# Patient Record
Sex: FEMALE | Race: WHITE | Employment: FULL TIME | URBAN - METROPOLITAN AREA
[De-identification: names, ages, dates, MRNs, and addresses within clinical notes are randomized per-mention and may not be internally consistent; named-entity substitution may affect disease eponyms.]

---

## 2016-12-16 LAB
ANTIBODY SCREEN, EXTERNAL: NORMAL
CHLAMYDIA, EXTERNAL: NORMAL
HBSAG, EXTERNAL: NORMAL
HCT, EXTERNAL: 39.9
HEPATITIS C AB,   EXT: NORMAL
HGB, EXTERNAL: 13.5
HIV, EXTERNAL: NORMAL
N. GONORRHEA, EXTERNAL: NORMAL
PAP SMEAR, EXTERNAL: NORMAL
PLATELET CNT,   EXTERNAL: 288
RPR, EXTERNAL: NORMAL
RUBELLA, EXTERNAL: NORMAL
TSH SERPL-ACNC: NORMAL M[IU]/L
TYPE, ABO & RH, EXTERNAL: NORMAL

## 2017-07-11 LAB — GRBS, EXTERNAL: NEGATIVE

## 2017-07-29 ENCOUNTER — HOSPITAL ENCOUNTER (INPATIENT)
Age: 27
LOS: 4 days | Discharge: HOME | End: 2017-08-02
Attending: OBSTETRICS & GYNECOLOGY | Admitting: OBSTETRICS & GYNECOLOGY
Payer: COMMERCIAL

## 2017-07-29 PROBLEM — O99.213 OBESITY AFFECTING PREGNANCY IN THIRD TRIMESTER: Chronic | Status: ACTIVE | Noted: 2017-07-29

## 2017-07-29 PROBLEM — O14.93 PRE-ECLAMPSIA IN THIRD TRIMESTER: Status: ACTIVE | Noted: 2017-07-29

## 2017-07-29 LAB
ABO + RH BLD: NORMAL
ALBUMIN SERPL BCP-MCNC: 2.4 G/DL (ref 3.4–5)
ALBUMIN/GLOB SERPL: 0.8 {RATIO} (ref 0.8–1.7)
ALP SERPL-CCNC: 147 U/L (ref 45–117)
ALT SERPL-CCNC: 18 U/L (ref 13–56)
ANION GAP BLD CALC-SCNC: 11 MMOL/L (ref 3–18)
AST SERPL W P-5'-P-CCNC: 16 U/L (ref 15–37)
BILIRUB SERPL-MCNC: 0.2 MG/DL (ref 0.2–1)
BLOOD GROUP ANTIBODIES SERPL: NORMAL
BUN SERPL-MCNC: 11 MG/DL (ref 7–18)
BUN/CREAT SERPL: 18 (ref 12–20)
CALCIUM SERPL-MCNC: 8.6 MG/DL (ref 8.5–10.1)
CHLORIDE SERPL-SCNC: 106 MMOL/L (ref 100–108)
CO2 SERPL-SCNC: 22 MMOL/L (ref 21–32)
CREAT SERPL-MCNC: 0.62 MG/DL (ref 0.6–1.3)
ERYTHROCYTE [DISTWIDTH] IN BLOOD BY AUTOMATED COUNT: 13.5 % (ref 11.6–14.5)
GLOBULIN SER CALC-MCNC: 3 G/DL (ref 2–4)
GLUCOSE SERPL-MCNC: 112 MG/DL (ref 74–99)
HCT VFR BLD AUTO: 35 % (ref 35–45)
HGB BLD-MCNC: 12.2 G/DL (ref 12–16)
MCH RBC QN AUTO: 30.5 PG (ref 24–34)
MCHC RBC AUTO-ENTMCNC: 34.9 G/DL (ref 31–37)
MCV RBC AUTO: 87.5 FL (ref 74–97)
PLATELET # BLD AUTO: 203 K/UL (ref 135–420)
PMV BLD AUTO: 8.9 FL (ref 9.2–11.8)
POTASSIUM SERPL-SCNC: 3.8 MMOL/L (ref 3.5–5.5)
PROT SERPL-MCNC: 5.4 G/DL (ref 6.4–8.2)
RBC # BLD AUTO: 4 M/UL (ref 4.2–5.3)
SODIUM SERPL-SCNC: 139 MMOL/L (ref 136–145)
SPECIMEN EXP DATE BLD: NORMAL
URATE SERPL-MCNC: 6 MG/DL (ref 2.6–7.2)
WBC # BLD AUTO: 8.4 K/UL (ref 4.6–13.2)

## 2017-07-29 PROCEDURE — 80053 COMPREHEN METABOLIC PANEL: CPT | Performed by: OBSTETRICS & GYNECOLOGY

## 2017-07-29 PROCEDURE — 83615 LACTATE (LD) (LDH) ENZYME: CPT | Performed by: OBSTETRICS & GYNECOLOGY

## 2017-07-29 PROCEDURE — 74011250636 HC RX REV CODE- 250/636: Performed by: OBSTETRICS & GYNECOLOGY

## 2017-07-29 PROCEDURE — 74011250637 HC RX REV CODE- 250/637: Performed by: OBSTETRICS & GYNECOLOGY

## 2017-07-29 PROCEDURE — 85027 COMPLETE CBC AUTOMATED: CPT | Performed by: OBSTETRICS & GYNECOLOGY

## 2017-07-29 PROCEDURE — 84550 ASSAY OF BLOOD/URIC ACID: CPT | Performed by: OBSTETRICS & GYNECOLOGY

## 2017-07-29 PROCEDURE — 65270000029 HC RM PRIVATE

## 2017-07-29 PROCEDURE — 77030033269 HC SLV COMPR SCD KNE2 CARD -B

## 2017-07-29 PROCEDURE — 86900 BLOOD TYPING SEROLOGIC ABO: CPT | Performed by: OBSTETRICS & GYNECOLOGY

## 2017-07-29 RX ORDER — HYDROCORTISONE 1 %
CREAM (GRAM) TOPICAL
Status: DISCONTINUED | OUTPATIENT
Start: 2017-07-29 | End: 2017-08-02 | Stop reason: HOSPADM

## 2017-07-29 RX ORDER — SODIUM CHLORIDE, SODIUM LACTATE, POTASSIUM CHLORIDE, CALCIUM CHLORIDE 600; 310; 30; 20 MG/100ML; MG/100ML; MG/100ML; MG/100ML
50 INJECTION, SOLUTION INTRAVENOUS CONTINUOUS
Status: DISCONTINUED | OUTPATIENT
Start: 2017-07-29 | End: 2017-08-02 | Stop reason: HOSPADM

## 2017-07-29 RX ORDER — SODIUM CHLORIDE 0.9 % (FLUSH) 0.9 %
5-10 SYRINGE (ML) INJECTION AS NEEDED
Status: DISCONTINUED | OUTPATIENT
Start: 2017-07-29 | End: 2017-08-02 | Stop reason: HOSPADM

## 2017-07-29 RX ORDER — SODIUM CHLORIDE 0.9 % (FLUSH) 0.9 %
5-10 SYRINGE (ML) INJECTION EVERY 8 HOURS
Status: DISCONTINUED | OUTPATIENT
Start: 2017-07-29 | End: 2017-08-01

## 2017-07-29 RX ORDER — VALACYCLOVIR HYDROCHLORIDE 500 MG/1
500 TABLET, FILM COATED ORAL DAILY
COMMUNITY
End: 2017-08-02

## 2017-07-29 RX ORDER — VALACYCLOVIR HYDROCHLORIDE 500 MG/1
500 TABLET, FILM COATED ORAL DAILY
Status: DISCONTINUED | OUTPATIENT
Start: 2017-07-30 | End: 2017-08-02 | Stop reason: HOSPADM

## 2017-07-29 RX ADMIN — DINOPROSTONE 10 MG: 10 INSERT VAGINAL at 21:00

## 2017-07-29 RX ADMIN — Medication 10 ML: at 22:00

## 2017-07-29 RX ADMIN — HYDROCORTISONE: 1 CREAM TOPICAL at 23:45

## 2017-07-29 RX ADMIN — SODIUM CHLORIDE, SODIUM LACTATE, POTASSIUM CHLORIDE, AND CALCIUM CHLORIDE 50 ML/HR: 600; 310; 30; 20 INJECTION, SOLUTION INTRAVENOUS at 20:30

## 2017-07-29 NOTE — ROUTINE PROCESS
Bedside shift change report given to Juliet Dong RN (oncoming nurse) by Jasen Christian RN (offgoing nurse). Report included the following information SBAR.

## 2017-07-29 NOTE — ROUTINE PROCESS
Pt presents to [facility] for scheduled induction for [facility], Pt oriented to room, changed into gownm EFM and toco placed and explained. Plan of care discussed, pt verbalized understanding.

## 2017-07-29 NOTE — IP AVS SNAPSHOT
Shasha Abdi 
 
 
 [address] [place] [address] Patient: Boo Ruiz MRN: [id number] :[id number] You are allergic to the following No active allergies Immunizations Administered for This Admission Name Date Tdap  Deferred () Recent Documentation Height Weight Breastfeeding? BMI OB Status Smoking Status 1.651 m 118.8 kg Unknown 43.6 kg/m2 Recent pregnancy Former Smoker Emergency Contacts Name Discharge Info Relation Home Work Mobile Ben Barkley  Spouse [3] [phone] About your hospitalization You were admitted on:  2017 You last received care in the:  [place] [address] You were discharged on:  2017 Unit phone number:  [phone] Why you were hospitalized Your primary diagnosis was:  Not on File Your diagnoses also included:  Pre-Eclampsia In Third Trimester, Obesity Affecting Pregnancy In Third Trimester Providers Seen During Your Hospitalizations Provider Role Specialty Primary office phone Edna Queen MD Attending Provider Obstetrics & Gynecology [phone] Your Primary Care Physician (PCP) Primary Care Physician Office Phone Office Fax NONE ** None ** ** None ** Follow-up Information Follow up With Details Comments Contact Info None   None ([phone]) Patient stated that they have no PCP Edna Queen MD Go to appointment has been made for Tues. 2017 at [facility] [address] [place] [address] 83 [phone] Current Discharge Medication List  
  
START taking these medications Dose & Instructions Dispensing Information Comments Morning Noon Evening Bedtime  
 ibuprofen 800 mg tablet Commonly known as:  MOTRIN Your last dose was:     
   
Your next dose is:    
   
   
 Dose:  800 mg  
 Take 1 Tab by mouth every eight (8) hours as needed. Quantity:  60 Tab Refills:  1 CONTINUE these medications which have NOT CHANGED Dose & Instructions Dispensing Information Comments Morning Noon Evening Bedtime PNV Combo No.47-Iron-FA #1-DHA 27 mg iron-1 mg -300 mg Cap Your last dose was: Your next dose is:    
   
   
 Dose:  1 Tab Take 1 Tab by mouth daily. Indications: Pregnancy Refills:  0 STOP taking these medications VALTREX 500 mg tablet Generic drug:  valACYclovir Where to Get Your Medications Information on where to get these meds will be given to you by the nurse or doctor. ! Ask your nurse or doctor about these medications  
  ibuprofen 800 mg tablet Discharge Instructions CONGRATULATIONS ON THE BIRTH OF YOUR BABY! The first six weeks after childbirth is a time of physical and emotional adjustment. This handout will help to answer questions and provide guidance during the postpartum period. Every family's adjustment is unique, so please call if you have further concerns. At anytime we can be reached at [phone]. During office hours please ask to speak to a charge nurse. After hours, the answering service will take a message and the Nurse-Midwife on-call will return your call. If your question can wait until office hours: Monday-Friday 8:30-4:00, please do so. For emergencies or urgent concerns do not hesitate to call us after hours. DIET Your body is in need of a well-balanced, high protein diet to recuperate from birth. Please continue to take your prenatal vitamins for 6 weeks or as long as you are breastfeeding. Continue to drink at least 6-8 cups of water or other liquid a day. A breastfeeding mother also needs extra protein, calories and calcium containing foods.   It is a good rule to drink fluids with every feeding in order to maintain an adequate milk supply and avoid dehydration. Your baby will probably not be bothered by things in your diet, but if the baby seems extremely fussy or develops a rash, you may want to discuss possible food intolerances with your baby's care provider. PAIN MEDICATIONS Acetaminophen (Tylenol), ibuprofen (Motrin), or other prescribed pain medication may be taken as directed to relieve discomfort. The above medications pass in very minimal amounts into the breast milk and usually will not cause problems. There are medications that may affect the baby, so please consult your baby's care provider before taking medication. If you are breastfeeding, be sure to mention this to any care provider you see so that medications that are safe may be selected. There is an excellent resource called [employer] that is a resource for medication safety in pregnancy and lactation. You can visit their website at CaseRev/ or call them toll free at [phone] if you have any questions about medication safety. UTERINE INVOLUTION / VAGINAL BLEEDING Involution is the process of the uterus returning to pre-pregnant size. It will take approximately six weeks for this process to occur. To achieve this size your uterus becomes firm to slow bleeding loss from the placental site. The first 7 days after birth, the bleeding is red and heavy. It may change with your activity and position. Some small clots are normal.   After ten days, the bleeding should be pale pink and slowed considerably. The next several weeks may progress to a pink, mucousy discharge. This may continue for 6-8 weeks, depending on your activity. During the first four weeks after delivery we recommend using sanitary pads instead of tampons. Douching should also be avoided, but it is fine to take a tub bath so long as the tub is very clean.  
 
 
ACTIVITY/EXERCISE 
 Adequate rest is essential to recovery. Try to rest or sleep when the baby sleeps. After two weeks, you may begin going for short walks, doing Kegel exercises and abdominal crunches. Avoid heavy, jarring or aerobic exercises. Remember to start out slowly and build up to your previous fitness level. Use common sense and don't overdo as rest is important and the benefits of increased rest are a quicker recovery. For the first two weeks after a  try to limit trips up or down steps. Do not lift anything heavier than the baby during this time. Lifting the baby or other objects should be done by bending at the knees rather than the waist.  Driving should be avoided during the first two to three weeks until you have the strength to push firmly on the brakes in case of an emergency. You may ride as a passenger, but DO wear a seat belt at all times. After a few weeks, you may resume normal activity at whatever pace is comfortable for you. Exercise may also be resumed gradually. Walking is a good way to start. Finally, try to be reasonable in your expectations. Caring for a new baby after major surgery can be quite trying. Arrange for assistance at home to ensure that you get enough rest.  
 
POSTPARTUM CHECK You may call the office when you return home to set up a postpartum visit. Most patients will be seen at 6 weeks after delivery, but after a  or other circumstances you may be seen in 2 weeks or less. If you are discharged from the hospital with staples that must be removed, you will be asked to come in sooner. At your postpartum visit, a pelvic exam may be performed. If you are having any problems or concerns, please do not hesitate to call. Once again our number is [phone]. MOOD CHANGES Significant hormonal changes occur in the days following delivery, and as a result, many women experience brief episodes of tearfulness or feeling \"blue. \"  These emotional swings may be made worse by lack of sleep and by the adjustments inherent in becoming a mother. For some women, these fluctuations are minor. For others, they are overwhelming; creating feelings of anxiety, depression, or the inability to cope. If you have difficulty functioning as a result of feeling down, or if the mood changes seem severe, do not improve, or result is thoughts of harming yourself or others CALL RIGHT AWAY. PERINEAL CARE The basic goals of perineal care are to prevent infection, to relieve pain and promote healing. Your stitches will dissolve in four to six weeks, and do not need to be removed. After urinating, please continue to clean with warm water from front to back. Please continue sitz baths as instructed twice a day for a week or as needed. Call the office if you see pus in the suture site, or have unusual or severe swelling or pain that seems to be getting worse. INCISION CARE If you had a , clean and dry the incision gently as you would the rest of your body. Washing over the area with soap and water, and showering are fine. If steri-strips are present they will gradually come off with time. Tub baths are permitted. You may experience numbness and burning in the area surrounding the incision which usually resolves gradually over the next several weeks or months. RETURN OF MENSTRUATION Your first menstrual period may occur as soon as four to six weeks after your delivery if you are not breast-feeding. If breast-feeding it is more difficult to predict when your first period will occur. Even if you are not yet menstruating, you may be ovulating and it may be possible to conceive again. It is common for your first period after childbirth to be very heavy with an increased amount of cramping. BREASTS Breast-feeding Mothers: Colostrum is excreted in the first 24-72 hours. Mature breast milk will appear on the 2nd to 5th day. Engorgement may occur with the mature milk making your breasts feel warm and very full. Frequent feedings will make you more comfortable. Babies do not nurse on regular schedules. Nursing every 1 1/2 to 2 hours is normal and frequent feeding DOES NOT mean you are not making enough milk. To avoid nipple confusion, do not give bottles for the first 4 weeks. Growth spurts are common and may require more frequent feedings. This is the way baby increases your milk supply. During a growth spurt, you may feel you are feeding very frequently and that your breasts are \"empty. \"  Don't worry, your milk is produced by supply and demand so this increased frequency of feeding will increase your milk supply within 48 hours. Sore nipples may occur with frequent feedings and are sometimes also caused by improper latch. Check for a proper latch. Baby should have a wide open mouth. Use different positions at each feeding if possible. Express a small amount of colostrum or breast milk onto the sore area and leave bra flaps unlatched until dry. The lactation consultant at [facility] is available for outpatient consultation without charge. Call [phone] from Monday-Friday 9:00am- 3:00pm to arrange an outpatient appointment with her. Local [facility] and consultants may also be very helpful. If You Are Not Breast-feeding: You will experience swelling, engorgement and some milk production. There are no safe medications available to stop lactation. Some remedies for engorgement include: wearing a tight bra, ice packs and cold green cabbage leaves placed between the breast and your bra. Change these frequently. Tylenol or Motrin should help with the discomfort. SEXUAL ADJUSTMENTS We recommend that you wait at least four weeks before resuming sexual intercourse.   A sore perineum, a demanding baby and fatigue will certainly affect your ability to enjoy lovemaking! A vaginal lubricant is recommended to help with any dryness. It is very important to remember that you will ovulate BEFORE your first period and can conceive. If you do not wish another pregnancy right away, please take precautions to avoid pregnancy. If you would like a prescription method of birth control, please discuss this with us at your 6 week visit. ELIMINATION We remind all postpartum patients that it may take a few days for your bowels to return to normal, especially if you had a long labor. For those who had C-sections or severe lacerations, we recommend that you use a stool softener twice daily for at least two weeks. Many stool softeners are over-the-counter. Colace (Docusate Sodium) is recommended. Bulk forming agents such as Metamucil or Fibercon may be used daily in addition to a stool softener to promote regular bowel movements. Eating fresh fruits and vegetables along with whole grains is helpful as well. Do not be afraid to have a bowel movement as your stitches will not \"come out\" in the course of having a bowel movement. Urination may be difficult due to soreness around the urethra, or as an after effect of epidural.  This is temporary and can be helped  by squirting water over the perineum or try going in the shower. Hemorrhoids are common after birth. Tucks pads, Anusol cream and avoiding constipation are helpful. If constipation does occur, you may take Milk of Magnesia or Senekot according to the package instructions. DANGER SIGNS! CALL WITHOUT DELAY IF YOU ARE EXPERIENCING ANY OF THE FOLLOWING: 
* Unusually heavy bleeding, soaking more than 1 or more pads in an hour. * Vaginal discharge with strong foul odor. * Fever of 101 or higher * Unusual pain or tenderness in the abdominal area. * If breasts are red, hot or have a painful lump. * Depression that persists longer than 1-2 weeks or is severe. * Any urinary frequency accompanied by urgency or pain. * A lump in leg or calf especially if painful, warm or red. We thank you for choosing us for your prenatal care and/or delivery. We wish you all happiness and health with your baby for his or her lifetime! Karen Metz CNM Patient armband removed and shredded Discharge Instructions Attachments/References POSTPARTUM (ENGLISH) BREASTFEEDING (ENGLISH) BREASTFEEDING MOTHERS: NUTRITION (ENGLISH) DEPRESSION: POSTPARTUM (ENGLISH) Discharge Orders None Introducing [facility]! Willadean Saint introduces [facility] patient portal. Now you can access parts of your medical record, email your doctor's office, and request medication refills online. 1. In your internet browser, go to [email or web address] 2. Click on the First Time User? Click Here link in the Sign In box. You will see the New Member Sign Up page. 3. Enter your [facility] Access Code exactly as it appears below. You will not need to use this code after youve completed the sign-up process. If you do not sign up before the expiration date, you must request a new code. · [facility] Access Code: [email or web address] Expires: 10/31/2017 11:13 AM 
 
4. Enter the last four digits of your Social Security Number (xxxx) and Date of Birth (mm/dd/yyyy) as indicated and click Submit. You will be taken to the next sign-up page. 5. Create a [facility] ID. This will be your [facility] login ID and cannot be changed, so think of one that is secure and easy to remember. 6. Create a [facility] password. You can change your password at any time. 7. Enter your Password Reset Question and Answer. This can be used at a later time if you forget your password. 8. Enter your e-mail address. You will receive e-mail notification when new information is available in [address] 9. Click Sign Up. You can now view and download portions of your medical record. 10. Click the Download Summary menu link to download a portable copy of your medical information. If you have questions, please visit the Frequently Asked Questions section of the [employer] website. Remember, [employer] is NOT to be used for urgent needs. For medical emergencies, dial 911. Now available from your iPhone and Android! General Information Please provide this summary of care documentation to your next provider. Patient Signature:  ____________________________________________________________ Date:  ____________________________________________________________  
  
[facility] Provider Signature:  ____________________________________________________________ Date:  ____________________________________________________________ More Information After Your Delivery (the Postpartum Period): Care Instructions Your Care Instructions Congratulations on the birth of your baby. Like pregnancy, the  period can be a time of excitement, nghia, and exhaustion. You may look at your wondrous little baby and feel happy. You may also be overwhelmed by your new sleep hours and new responsibilities. At first, babies often sleep during the days and are awake at night. They do not have a pattern or routine. They may make sudden gasps, jerk themselves awake, or look like they have crossed eyes. These are all normal, and they may even make you smile. In these first weeks after delivery, try to take good care of yourself. It may take 4 to 6 weeks to feel like yourself again, and possibly longer if you had a  birth. You will likely feel very tired for several weeks. Your days will be full of ups and downs, but lots of nghia as well. Follow-up care is a key part of your treatment and safety. Be sure to make and go to all appointments, and call your doctor if you are having problems.  It's also a good idea to know your test results and keep a list of the medicines you take. How can you care for yourself at home? Take care of your body after delivery · Use pads instead of tampons for the bloody flow that may last as long as 2 weeks. · Ease cramps with ibuprofen (Advil, Motrin). · Ease soreness of hemorrhoids and the area between your vagina and rectum with ice compresses or witch hazel pads. · Ease constipation by drinking lots of fluid and eating high-fiber foods. Ask your doctor about over-the-counter stool softeners. · Cleanse yourself with a gentle squeeze of warm water from a bottle instead of wiping with toilet paper. · Take a sitz bath in warm water several times a day. · Wear a good nursing bra. Ease sore and swollen breasts with warm, wet washcloths. · If you are not breastfeeding, use ice rather than heat for breast soreness. · Your period may not start for several months if you are breastfeeding. You may bleed more, and longer at first, than you did before you got pregnant. · Wait until you are healed (about 4 to 6 weeks) before you have sexual intercourse. Your doctor will tell you when it is okay to have sex. · Try not to travel with your baby for 5 or 6 weeks. If you take a long car trip, make frequent stops to walk around and stretch. Avoid exhaustion · Rest every day. Try to nap when your baby naps. · Ask another adult to be with you for a few days after delivery. · Plan for  if you have other children. · Stay flexible so you can eat at odd hours and sleep when you need to. Both you and your baby are making new schedules. · Plan small trips to get out of the house. Change can make you feel less tired. · Ask for help with housework, cooking, and shopping. Remind yourself that your job is to care for your baby. Know about help for postpartum depression · \"Baby blues\" are common for the first 1 to 2 weeks after birth. You may cry or feel sad or irritable for no reason. · Rest whenever you can. Being tired makes it harder to handle your emotions. · Go for walks with your baby. · Talk to your partner, friends, and family about your feelings. · If your symptoms last for more than a few weeks, or if you feel very depressed, ask your doctor for help. · Postpartum depression can be treated. Support groups and counseling can help. Sometimes medicine can also help. Stay healthy · Eat healthy foods so you have more energy, make good breast milk, and lose extra baby pounds. · If you breastfeed, avoid alcohol and drugs. Stay smoke-free. If you quit during pregnancy, congratulations. · Start daily exercise after 4 to 6 weeks, but rest when you feel tired. · Learn exercises to tone your belly. Do Kegel exercises to regain strength in your pelvic muscles. You can do these exercises while you stand or sit. ¨ Squeeze the same muscles you would use to stop your urine. Your belly and thighs should not move. ¨ Hold the squeeze for 3 seconds, and then relax for 3 seconds. ¨ Start with 3 seconds. Then add 1 second each week until you are able to squeeze for 10 seconds. ¨ Repeat the exercise 10 to 15 times for each session. Do three or more sessions each day. · Find a class for new mothers and new babies that has an exercise time. · If you had a  birth, give yourself a bit more time before you exercise, and be careful. When should you call for help? Call 911 anytime you think you may need emergency care. For example, call if: 
· You passed out (lost consciousness). Call your doctor now or seek immediate medical care if: 
· You have severe vaginal bleeding. This means you are passing blood clots and soaking through a pad each hour for 2 or more hours. · You are dizzy or lightheaded, or you feel like you may faint. · You have a fever. · You have new belly pain, or your pain gets worse. Watch closely for changes in your health, and be sure to contact your doctor if: · Your vaginal bleeding seems to be getting heavier. · You have new or worse vaginal discharge. · You feel sad, anxious, or hopeless for more than a few days. · You do not get better as expected. Where can you learn more? Go to http://katia-zia.info/. Enter A461 in the search box to learn more about \"After Your Delivery (the Postpartum Period): Care Instructions. \" Current as of: March 16, 2017 Content Version: 11.3 © 5727-1271 [email or web address]. Care instructions adapted under license by [employer] (which disclaims liability or warranty for this information). If you have questions about a medical condition or this instruction, always ask your healthcare professional. [address] any warranty or liability for your use of this information. Breastfeeding: Care Instructions Your Care Instructions Breastfeeding has many benefits. It may lower your baby's chances of getting an infection. It also may prevent your baby from having problems such as diabetes and high cholesterol later in life. Breastfeeding also helps you bond with your baby. The American Academy of Pediatrics recommends breastfeeding for at least a year. That may be very hard for many women to do, but breastfeeding even for a shorter period of time is a health benefit to you and your baby. In the first days after birth, your breasts make a thick, yellow liquid called colostrum. This liquid gives your baby nutrients and antibodies against infection. It is all that babies need in the first days after birth. Your breasts will fill with milk a few days after the birth. Breastfeeding is a skill that gets better with practice. It is common to have some problems. Some women have sore or cracked nipples, blocked milk ducts, or a breast infection (mastitis).  But if you feed your baby every 1 to 2 hours during the day and follow the tips on this sheet, you may not have these problems. You can treat these problems if they happen and continue breastfeeding. Follow-up care is a key part of your treatment and safety. Be sure to make and go to all appointments, and call your doctor if you are having problems. It's also a good idea to know your test results and keep a list of the medicines you take. How can you care for yourself at home? · Breastfeed your baby whenever he or she is hungry. In the first 2 weeks, your baby will feed about every 1 to 3 hours. This will help you keep up your supply of milk. · Put a bed pillow or a nursing pillow on your lap to support your arms and your baby. · Hold your baby in a comfortable position. ¨ You can hold your baby in several ways. One of the most common positions is the cradle hold. One arm supports your baby, with his or her head in the bend of your elbow. Your open hand supports your baby's bottom or back. Your baby's belly lies against yours. ¨ If you had your baby by , or , try the football hold. This position keeps your baby off your belly. Tuck your baby under your arm, with his or her body along the side you will be feeding on. Support your baby's upper body with your arm. With that hand you can control your baby's head to bring his or her mouth to your breast. 
¨ Try different positions with each feeding. If you are having problems, ask for help from your doctor or a lactation consultant. · To get your baby to latch on: 
¨ Support and narrow your breast with one hand using a \"U hold,\" with your thumb on the outer side of your breast and your fingers on the inner side. You can also use a \"C hold,\" with all your fingers below the nipple and your thumb above it. Try the different holds to get the deepest latch for whichever breastfeeding position you use. Your other arm is behind your baby's back, with your hand supporting the base of the baby's head. Position your fingers and thumb to point toward your baby's ears. ¨ You can touch your baby's lower lip with your nipple to get your baby to open his or her mouth. Wait until your baby opens up really wide, like a big yawn. Then be sure to bring the baby quickly to your breastnot your breast to the baby. As you bring your baby toward your breast, use your other hand to support the breast and guide it into his or her mouth. ¨ Both the nipple and a large portion of the darker area around the nipple (areola) should be in the baby's mouth. The baby's lips should be flared outward, not folded in (inverted). ¨ Listen for a regular sucking and swallowing pattern while the baby is feeding. If you cannot see or hear a swallowing pattern, watch the baby's ears, which will wiggle slightly when the baby swallows. If the baby's nose appears to be blocked by your breast, tilt the baby's head back slightly, so just the edge of one nostril is clear for breathing. ¨ When your baby is latched, you can usually remove your hand from supporting your breast and bring it under your baby to cradle him or her. Now just relax and breastfeed your baby. · You will know that your baby is feeding well when: 
¨ His or her mouth covers a lot of the areola, and the lips are flared out. ¨ His or her chin and nose rest against your breast. 
¨ Sucking is deep and rhythmic, with short pauses. ¨ You are able to see and hear your baby swallowing. ¨ You do not feel pain in your nipple. · If your baby takes only one breast at a feeding, start the next feeding on the other breast. 
· Anytime you need to remove your baby from the breast, put one finger in the corner of his or her mouth. Push your finger between your baby's gums to gently break the seal. If you do not break the tight seal before you remove your baby, your nipples can become sore, cracked, or bruised.  
· After feeding your baby, gently pat his or her back to let out any swallowed air. After your baby burps, offer the breast again, or offer the other breast. Sometimes a baby will want to keep feeding after being burped. When should you call for help? Call your doctor now or seek immediate medical care if: 
· You have symptoms of a breast infection, such as: 
¨ Increased pain, swelling, redness, or warmth around a breast. 
¨ Red streaks extending from the breast. 
¨ Pus draining from a breast. 
¨ A fever. · Your baby has no wet diapers for 6 hours. Watch closely for changes in your health, and be sure to contact your doctor if: 
· Your baby has trouble latching on to your breast. 
· You continue to have pain or discomfort when breastfeeding. · You have other questions or concerns. Where can you learn more? Go to http://katia-zia.info/. Enter P492 in the search box to learn more about \"Breastfeeding: Care Instructions. \" Current as of: March 16, 2017 Content Version: 11.3 © 7528-8787 [employer]. Care instructions adapted under license by [employer] (which disclaims liability or warranty for this information). If you have questions about a medical condition or this instruction, always ask your healthcare professional. [place] [address] any warranty or liability for your use of this information. Nutrition for Breastfeeding Mothers: Care Instructions Your Care Instructions When a woman breastfeeds her baby, she needs more nutrients to keep herself healthy and to make the baby's milk. Breastfeeding helps build the bond between you and your baby. It gives your baby excellent health benefits. A healthy diet includes eating a variety of foods from the basic food groups: grains, vegetables, fruits, milk and milk products (such as cheese and yogurt), and meat and dried beans. Eating well during breastfeeding will ensure that you stay healthy and your baby grows and develops normally. Follow-up care is a key part of your treatment and safety. Be sure to make and go to all appointments, and call your doctor if you are having problems. It's also a good idea to know your test results and keep a list of the medicines you take. How can you care for yourself at home? · Include 3 to 4 cups of nonfat or low-fat milk or milk products in your diet every day. These include: ¨ Milk (8 ounces equals 1 cup). ¨ Ice cream (1½ cups equals 1 cup of milk). ¨ Cheese (1½ ounces of cheese equals 1 cup). ¨ Yogurt (8 ounces equals 1 cup). · Eat at least 7 ounces of grains, such as cereals, breads, crackers, rice, or pasta, every day. One ounce is about 1 slice of bread, 1 cup of breakfast cereal, or ½ cup of cooked rice, cereal, or pasta. · Eat 3 cups of vegetables each day. Choices include: ¨ Dark-green vegetables such as broccoli and spinach. ¨ Orange vegetables such as carrots and sweet potatoes. ¨ Dried beans (such as musa and kidney beans) and peas (such as lentils). · Every day, eat 2 cups of fresh, frozen, or canned fruit. · Eat 6½ ounces each day of protein, such as chicken, fish, lean meat, eggs, peanut butter, dried beans and peas, nuts, and seeds. One egg, 1 tablespoon of peanut butter, or ½ ounce of nuts or seeds equals 1 ounce of protein. A ½ cup of cooked beans equals 2 ounces of protein. · Drink plenty of fluids, enough so that your urine is light yellow or clear like water. If you have kidney, heart, or liver disease and have to limit fluids, talk with your doctor before you increase the amount of fluids you drink. · Limit caffeine products, such as coffee, tea, chocolate, and some sodas. Caffeine can pass to your baby through breast milk. It may cause fussiness and sleep problems in babies. · Your doctor may recommend a vitamin supplement. Take it as recommended. · Consider joining a breastfeeding support group.  These are offered at many hospitals and birthing centers by nurses, nurse-midwives, or lactation consultants. When should you call for help? Watch closely for changes in your health, and be sure to contact your doctor if: 
· You feel that you are not making enough milk for your baby. · You are losing a lot of weight. · You do not think your baby is gaining enough weight. · You would like help to plan a healthy diet. Where can you learn more? Go to http://katia-zia.info/. Enter P234 in the search box to learn more about \"Nutrition for Breastfeeding Mothers: Care Instructions. \" Current as of: May 30, 2016 Content Version: 11.3 © 9128-8592 [employer]. Care instructions adapted under license by [employer] (which disclaims liability or warranty for this information). If you have questions about a medical condition or this instruction, always ask your healthcare professional. [place] [address] any warranty or liability for your use of this information. Depression After Childbirth: Care Instructions Your Care Instructions Many women get the \"baby blues\" during the first few days after childbirth. You may lose sleep, feel irritable, and cry easily. You may feel happy one minute and sad the next. Hormone changes are one cause of these emotional changes. Also, the demands of a new baby, along with visits from relatives or other family needs, add to a mother's stress. The \"baby blues\" often peak around the fourth day. Then they ease up in less than 2 weeks. If your moodiness or anxiety lasts for more than 2 weeks, or if you feel like life is not worth living, you may have postpartum depression. This is different for each mother. Some mothers with serious depression may worry intensely about their infant's well-being. Others may feel distant from their child. Some mothers might even feel that they might harm their baby. A mother may have signs of paranoia, wondering if someone is watching her. Depression is not a sign of weakness. It is a medical condition that requires treatment. Medicine and counseling often work well to reduce depression. Talk to your doctor about taking antidepressant medicine while breastfeeding. Follow-up care is a key part of your treatment and safety. Be sure to make and go to all appointments, and call your doctor if you are having problems. It's also a good idea to know your test results and keep a list of the medicines you take. How do you know if you are depressed? With all the changes in your life, you may not know if you are depressed. Pregnancy sometimes causes changes in how you feel that are similar to the symptoms of depression. Symptoms of depression include: · Feeling sad or hopeless and losing interest in daily activities. These are the most common symptoms of depression. · Sleeping too much or not enough. · Feeling tired. You may feel as if you have no energy. · Eating too much or too little. · Writing or talking about death, such as writing suicide notes or talking about guns, knives, or pills. Keep the numbers for these national suicide hotlines: 5-303-180-TALK (6-322-100-426-324-6956) and 1-287-VLGFCQT (6-345.950.1929). If you or someone you know talks about suicide or feeling hopeless, get help right away. How can you care for yourself at home? · Be safe with medicines. Take your medicines exactly as prescribed. Call your doctor if you think you are having a problem with your medicine. · Eat a healthy diet so that you can keep up your energy. · Get regular daily exercise, such as walks, to help improve your mood. · Get as much sunlight as possible. Keep your shades and curtains open. Get outside as much as you can. · Avoid using alcohol or other substances to feel better. · Get as much rest and sleep as possible. Avoid doing too much. Being too tired can increase depression. · Play stimulating music throughout your day and soothing music at night. · Schedule outings and visits with friends and family. Ask them to call you regularly, so that you do not feel alone. · Ask for help with preparing food and other daily tasks. Family and friends are often happy to help a mother with a . · Be honest with yourself and those who care about you. Tell them about your struggle. · Join a support group of new mothers. No one can better understand the challenges of caring for a  than other new mothers. · If you feel like life is not worth living or are feeling hopeless, get help right away. Keep the numbers for these national suicide hotlines: 2-749-361-TALK (4-643.129.9648) and 6-182-SBFFOQS (2-253.820.5178). When should you call for help? Call 911 anytime you think you may need emergency care. For example, call if: 
· You feel you cannot stop from hurting yourself, your baby, or someone else. Call your doctor now or seek immediate medical care if: 
· You are having trouble caring for yourself or your baby. · You hear voices. Watch closely for changes in your health, and be sure to contact your doctor if: 
· You have problems with your depression medicine. · You do not get better as expected. Where can you learn more? Go to http://katia-zia.info/. Enter Z822 in the search box to learn more about \"Depression After Childbirth: Care Instructions. \" Current as of: 2016 Content Version: 11.3 © 6294-2115 [employer]. Care instructions adapted under license by [employer] (which disclaims liability or warranty for this information). If you have questions about a medical condition or this instruction, always ask your healthcare professional. [address] any warranty or liability for your use of this information.

## 2017-07-30 ENCOUNTER — ANESTHESIA (OUTPATIENT)
Dept: LABOR AND DELIVERY | Age: 27
End: 2017-07-30
Payer: COMMERCIAL

## 2017-07-30 ENCOUNTER — ANESTHESIA EVENT (OUTPATIENT)
Dept: LABOR AND DELIVERY | Age: 27
End: 2017-07-30
Payer: COMMERCIAL

## 2017-07-30 PROCEDURE — 74011250637 HC RX REV CODE- 250/637: Performed by: OBSTETRICS & GYNECOLOGY

## 2017-07-30 PROCEDURE — 65270000029 HC RM PRIVATE

## 2017-07-30 PROCEDURE — 74011250636 HC RX REV CODE- 250/636: Performed by: OBSTETRICS & GYNECOLOGY

## 2017-07-30 PROCEDURE — 4A1H7CZ MONITORING OF PRODUCTS OF CONCEPTION, CARDIAC RATE, VIA NATURAL OR ARTIFICIAL OPENING: ICD-10-PCS | Performed by: OBSTETRICS & GYNECOLOGY

## 2017-07-30 PROCEDURE — 74011000250 HC RX REV CODE- 250

## 2017-07-30 PROCEDURE — 77030020255 HC SOL INJ LR 1000ML BG

## 2017-07-30 PROCEDURE — 3E0P7GC INTRODUCTION OF OTHER THERAPEUTIC SUBSTANCE INTO FEMALE REPRODUCTIVE, VIA NATURAL OR ARTIFICIAL OPENING: ICD-10-PCS | Performed by: OBSTETRICS & GYNECOLOGY

## 2017-07-30 PROCEDURE — 3E0R3CZ INTRODUCE REGIONAL ANESTH IN SPINAL CANAL, PERC: ICD-10-PCS | Performed by: OBSTETRICS & GYNECOLOGY

## 2017-07-30 PROCEDURE — 74011250636 HC RX REV CODE- 250/636

## 2017-07-30 PROCEDURE — 77030007879 HC KT SPN EPDRL TELE -B: Performed by: NURSE ANESTHETIST, CERTIFIED REGISTERED

## 2017-07-30 PROCEDURE — 10H07YZ INSERTION OF OTHER DEVICE INTO PRODUCTS OF CONCEPTION, VIA NATURAL OR ARTIFICIAL OPENING: ICD-10-PCS | Performed by: OBSTETRICS & GYNECOLOGY

## 2017-07-30 PROCEDURE — 10H073Z INSERTION OF MONITORING ELECTRODE INTO PRODUCTS OF CONCEPTION, VIA NATURAL OR ARTIFICIAL OPENING: ICD-10-PCS | Performed by: OBSTETRICS & GYNECOLOGY

## 2017-07-30 PROCEDURE — 3E033VJ INTRODUCTION OF OTHER HORMONE INTO PERIPHERAL VEIN, PERCUTANEOUS APPROACH: ICD-10-PCS | Performed by: OBSTETRICS & GYNECOLOGY

## 2017-07-30 RX ORDER — FENTANYL CITRATE 50 UG/ML
INJECTION, SOLUTION INTRAMUSCULAR; INTRAVENOUS
Status: COMPLETED
Start: 2017-07-30 | End: 2017-07-30

## 2017-07-30 RX ORDER — LIDOCAINE HYDROCHLORIDE 10 MG/ML
INJECTION INFILTRATION; PERINEURAL
Status: DISPENSED
Start: 2017-07-30 | End: 2017-07-31

## 2017-07-30 RX ORDER — OXYTOCIN/RINGER'S LACTATE 20/1000 ML
PLASTIC BAG, INJECTION (ML) INTRAVENOUS
Status: COMPLETED
Start: 2017-07-30 | End: 2017-07-31

## 2017-07-30 RX ORDER — PHENYLEPHRINE HCL IN 0.9% NACL 0.4MG/10ML
80 SYRINGE (ML) INTRAVENOUS AS NEEDED
Status: DISCONTINUED | OUTPATIENT
Start: 2017-07-30 | End: 2017-07-31 | Stop reason: HOSPADM

## 2017-07-30 RX ORDER — LIDOCAINE HYDROCHLORIDE 20 MG/ML
INJECTION, SOLUTION EPIDURAL; INFILTRATION; INTRACAUDAL; PERINEURAL AS NEEDED
Status: DISCONTINUED | OUTPATIENT
Start: 2017-07-30 | End: 2017-07-31 | Stop reason: HOSPADM

## 2017-07-30 RX ORDER — OXYTOCIN IN 5 % DEXTROSE 30/500 ML
.5-2 PLASTIC BAG, INJECTION (ML) INTRAVENOUS
Status: DISCONTINUED | OUTPATIENT
Start: 2017-07-30 | End: 2017-08-02 | Stop reason: HOSPADM

## 2017-07-30 RX ORDER — CASTOR OIL 100 %
OIL (ML) ORAL
Status: DISCONTINUED
Start: 2017-07-30 | End: 2017-07-31

## 2017-07-30 RX ORDER — LIDOCAINE HYDROCHLORIDE AND EPINEPHRINE 15; 5 MG/ML; UG/ML
INJECTION, SOLUTION EPIDURAL AS NEEDED
Status: DISCONTINUED | OUTPATIENT
Start: 2017-07-30 | End: 2017-07-31 | Stop reason: HOSPADM

## 2017-07-30 RX ORDER — FENTANYL CITRATE 50 UG/ML
100 INJECTION, SOLUTION INTRAMUSCULAR; INTRAVENOUS ONCE
Status: COMPLETED | OUTPATIENT
Start: 2017-07-30 | End: 2017-07-30

## 2017-07-30 RX ORDER — BUPIVACAINE HYDROCHLORIDE 5 MG/ML
INJECTION, SOLUTION EPIDURAL; INTRACAUDAL AS NEEDED
Status: DISCONTINUED | OUTPATIENT
Start: 2017-07-30 | End: 2017-07-31 | Stop reason: HOSPADM

## 2017-07-30 RX ORDER — ROPIVACAINE HYDROCHLORIDE 2 MG/ML
INJECTION, SOLUTION EPIDURAL; INFILTRATION; PERINEURAL AS NEEDED
Status: DISCONTINUED | OUTPATIENT
Start: 2017-07-30 | End: 2017-07-31 | Stop reason: HOSPADM

## 2017-07-30 RX ADMIN — LIDOCAINE HYDROCHLORIDE 10 ML: 20 INJECTION, SOLUTION EPIDURAL; INFILTRATION; INTRACAUDAL; PERINEURAL at 20:30

## 2017-07-30 RX ADMIN — FENTANYL CITRATE 100 MCG: 50 INJECTION, SOLUTION INTRAMUSCULAR; INTRAVENOUS at 18:57

## 2017-07-30 RX ADMIN — LIDOCAINE HYDROCHLORIDE 10 ML: 20 INJECTION, SOLUTION EPIDURAL; INFILTRATION; INTRACAUDAL; PERINEURAL at 20:25

## 2017-07-30 RX ADMIN — LIDOCAINE HYDROCHLORIDE AND EPINEPHRINE 3 ML: 15; 5 INJECTION, SOLUTION EPIDURAL at 18:49

## 2017-07-30 RX ADMIN — LIDOCAINE HYDROCHLORIDE 10 ML: 20 INJECTION, SOLUTION EPIDURAL; INFILTRATION; INTRACAUDAL; PERINEURAL at 22:30

## 2017-07-30 RX ADMIN — BUPIVACAINE HYDROCHLORIDE 10 ML: 5 INJECTION, SOLUTION EPIDURAL; INTRACAUDAL at 22:38

## 2017-07-30 RX ADMIN — SODIUM CHLORIDE, SODIUM LACTATE, POTASSIUM CHLORIDE, AND CALCIUM CHLORIDE 50 ML/HR: 600; 310; 30; 20 INJECTION, SOLUTION INTRAVENOUS at 22:35

## 2017-07-30 RX ADMIN — ROPIVACAINE HYDROCHLORIDE 5 ML: 2 INJECTION, SOLUTION EPIDURAL; INFILTRATION; PERINEURAL at 18:55

## 2017-07-30 RX ADMIN — VALACYCLOVIR HYDROCHLORIDE 500 MG: 500 TABLET, FILM COATED ORAL at 10:21

## 2017-07-30 RX ADMIN — ROPIVACAINE HYDROCHLORIDE 5 ML: 2 INJECTION, SOLUTION EPIDURAL; INFILTRATION; PERINEURAL at 18:54

## 2017-07-30 RX ADMIN — Medication 10 ML/HR: at 18:57

## 2017-07-30 RX ADMIN — Medication 2 MILLI-UNITS/MIN: at 10:43

## 2017-07-30 NOTE — PROGRESS NOTES
Called L&D, they are having problems getting IV access , may need anesthesia consult.  Will have bedside US ready

## 2017-07-30 NOTE — ANESTHESIA PROCEDURE NOTES
Epidural Block    Start time: 7/30/2017 6:20 PM  End time: 7/30/2017 6:56 PM  Performed by: Abhishek Garcia by: Foster Jackson     Pre-Procedure  Indication: labor epidural    Preanesthetic Checklist: patient identified, risks and benefits discussed, anesthesia consent, patient being monitored, timeout performed and anesthesia consent    Timeout Time: 18:26        Epidural:   Patient position:  Seated  Prep region:  Lumbar  Prep: Betadine and Patient draped    Prep comment:  X3  Location:  L3-4    Needle and Epidural Catheter:   Needle Type:  Tuohy  Needle Gauge:  18 G  Injection Technique:  Loss of resistance using saline  Attempts:  2  Catheter Size:  20 G  Catheter at Skin Depth (cm):  15  Depth in Epidural Space (cm):  7  Events: no blood with aspiration, no cerebrospinal fluid with aspiration, no paresthesia and negative aspiration test    Test Dose:  Lidocaine 1.5% w/ epi and negative (3 cc's @ 1836 and 1849)    Assessment:   Catheter Secured:  Tegaderm and tape  Insertion:  Uncomplicated  Patient tolerance:  Patient tolerated the procedure well with no immediate complications  First attempt - no difficulties with insertion. Aspirated blood before test dose. Catheter pulled back but continued to aspirate blood.  Placed second catheter without difficulty, test dose negative, Fentanyl 50 + 50 mcg 1853 via KAI

## 2017-07-30 NOTE — PROGRESS NOTES
OB Progress Note    S: Pt reports pain is mild    O: Patient Vitals for the past 4 hrs:   Temp Pulse BP   07/30/17 1416 98.5 °F (36.9 °C) 74 (!) 157/97   07/30/17 1401 - 70 (!) 157/97   07/30/17 1346 - 69 158/87   07/30/17 1331 - 75 148/89   07/30/17 1329 - 78 145/90   07/30/17 1201 - 76 122/75   07/30/17 1146 - 83 125/86   07/30/17 1131 - 78 128/84   07/30/17 1116 - 81 135/73   07/30/17 1101 - 79 129/80   07/30/17 1055 97.9 °F (36.6 °C) - -   07/30/17 1047 - 89 133/84   07/30/17 1046 - 88 142/83            VE: unchanged     TOCO: irregular     FHT: category1      Presentation:V  A/P: IUP at  [id number], Pre eclampsia, IOL with Pit drip,reassuring fetal status          GBS negative  Jarred Jarvis MD  July 30, 2017

## 2017-07-30 NOTE — PROGRESS NOTES
Labor Progress Note  Michelle Gaona reports SROM. Clear fluid noted. Agrees to SVE. Asking about epidural.  Discuss may be optimal to wait a bit but we can make a different plan if she is too uncomfortable. She elects to wait a bit. Pelvic exam:       Cervical Exam  Dilation (cm): 3 cms in a very snug bony pelvis  Eff: 70 %  Station: -2  Membrane Status: SROM  FHTs Reactive from 140  UCs q 2-4 with pitocin at 10 mu    Visit Vitals    /86    Pulse 84    Temp 98.3 °F (36.8 °C)    Resp 18    Ht 5' 5\" (1.651 m)    Wt 118.8 kg (262 lb)    Breastfeeding No    BMI 43.6 kg/m2       Temp (24hrs), Av.1 °F (36.7 °C), Min:97.8 °F (36.6 °C), Max:98.5 °F (36.9 °C)      Recent Labs      17   2040   WBC  8.4   HGB  12.2           Assessment: SROM. Irregular contraction pattern. Reassuring FHTs. Plan: Continue pitocin and monitor for progress. Epidural soon. Dr Natividad Murguia updated.    Toshia Lopez CNM  2017  6:09 PM

## 2017-07-30 NOTE — ROUTINE PROCESS
[facility] care of patient, received report from [address], [address], DEYANIRA at bedside, IUPC placed, SVE 2-3/70/-2.  Spoke with Debbie Figueroa CNM, DEYANIRA notified as follows: SVE 90/-2, pain level has been increased to 10/10, anesthesia paged to give bolus through epidural catheter. Pitocin has been decreased to 6mu. Order given to decrease Pitocin to 3mu.  SVE 10/100/0, CNM notified. CNM is in with another patient, will stop Pitocin.  Dr. Nessa Barraza is on her way to unit, gave order to restart Pitocin.  Spoke with SOILA Jason CNM, report given as follows: recheck of cervix SVE -/0, Pitocin has been restarted, patient cont. is complaining of pain with contractions will page anesthesia to bolus through epidural catheter. 2801 Viviane Fiore CRNA at bedside to bolus through epidural catheter. Alex Geller CNM at bedside, SVE 10, patient will labor down. 2310 Patient placed in left side lying with right leg up in stirupps. 2350 Patient placed in right side lying position with left leg in stirrups. 0020 Patient placed in left side lying position with right leg up in stirrups. 0105  Patient placed in position and starting to push.  0225 SOILA Jason CNM called to bedside to assess large amount of bleeding noted during pushing. 0230 Patient placed in stirrups and continues to push. 0247  of viable baby girl over 2 nd degree tear repaired by DEYANIRA, EBL 350ml, cord clamped and cut, infant taken to warmer immediately for evaluation by nursery nurse and Dr. Yogi Oshea. 0510 Patient ambulated to restroom with assistance and was able to void, isaias-care given, pads and gown chnged. 2036 TRANSFER - OUT REPORT:    Verbal report given to Nettie Moran RN (name) on Ivett Layne  being transferred to [address] [place] [address] (unit) for routine progression of care       Report consisted of patients Situation, Background, Assessment and   Recommendations(SBAR).      Information from the following report(s) SBAR, Kardex, Intake/Output, MAR and Recent Results was reviewed with the receiving nurse. Lines:   Peripheral IV 07/29/17 Left Forearm (Active)   Site Assessment Clean, dry, & intact 7/30/2017  7:30 AM   Phlebitis Assessment 0 7/30/2017  7:30 AM   Infiltration Assessment 0 7/30/2017  7:30 AM   Dressing Status Clean, dry, & intact 7/30/2017  7:30 AM   Dressing Type Transparent 7/30/2017  7:30 AM   Hub Color/Line Status Infusing 7/30/2017  7:30 AM   Action Taken Catheter retaped 7/29/2017  8:48 PM   Alcohol Cap Used Yes 7/29/2017  8:48 PM        Opportunity for questions and clarification was provided.       Patient transported with:   Registered Nurse

## 2017-07-30 NOTE — PROGRESS NOTES
Antepartum progress note    Patient seen, fetal heart rate and contraction pattern evaluated, patient examined. Patient Vitals for the past 8 hrs:   Temp Pulse BP   07/30/17 0831 - 75 121/79   07/30/17 0801 - 76 128/80   07/30/17 0731 - 68 129/81   07/30/17 0730 98.2 °F (36.8 °C) - -   07/30/17 0701 - 67 136/89   07/30/17 0631 - 70 131/79   07/30/17 0601 - 71 131/88   07/30/17 0531 - 76 118/82   07/30/17 0501 - 80 116/71   07/30/17 0431 - 73 129/89   07/30/17 0401 - 71 127/82   07/30/17 0331 - 74 127/85   07/30/17 0231 - 71 (!) 133/100   07/30/17 0201 - 77 143/90   07/30/17 0149 - 76 (!) 142/92         Recent Results (from the past 24 hour(s))   METABOLIC PANEL, COMPREHENSIVE    Collection Time: 07/29/17  8:40 PM   Result Value Ref Range    Sodium 139 136 - 145 mmol/L    Potassium 3.8 3.5 - 5.5 mmol/L    Chloride 106 100 - 108 mmol/L    CO2 22 21 - 32 mmol/L    Anion gap 11 3.0 - 18 mmol/L    Glucose 112 (H) 74 - 99 mg/dL    BUN 11 7.0 - 18 MG/DL    Creatinine 0.62 0.6 - 1.3 MG/DL    BUN/Creatinine ratio 18 12 - 20      GFR est AA >60 >60 ml/min/1.73m2    GFR est non-AA >60 >60 ml/min/1.73m2    Calcium 8.6 8.5 - 10.1 MG/DL    Bilirubin, total 0.2 0.2 - 1.0 MG/DL    ALT (SGPT) 18 13 - 56 U/L    AST (SGOT) 16 15 - 37 U/L    Alk.  phosphatase 147 (H) 45 - 117 U/L    Protein, total 5.4 (L) 6.4 - 8.2 g/dL    Albumin 2.4 (L) 3.4 - 5.0 g/dL    Globulin 3.0 2.0 - 4.0 g/dL    A-G Ratio 0.8 0.8 - 1.7     URIC ACID    Collection Time: 07/29/17  8:40 PM   Result Value Ref Range    Uric acid 6.0 2.6 - 7.2 MG/DL   CBC W/O DIFF    Collection Time: 07/29/17  8:40 PM   Result Value Ref Range    WBC 8.4 4.6 - 13.2 K/uL    RBC 4.00 (L) 4.20 - 5.30 M/uL    HGB 12.2 12.0 - 16.0 g/dL    HCT 35.0 35.0 - 45.0 %    MCV 87.5 74.0 - 97.0 FL    MCH 30.5 24.0 - 34.0 PG    MCHC 34.9 31.0 - 37.0 g/dL    RDW 13.5 11.6 - 14.5 %    PLATELET 957 279 - 489 K/uL    MPV 8.9 (L) 9.2 - 11.8 FL   TYPE & SCREEN    Collection Time: 07/29/17  8:40 PM Result Value Ref Range    Crossmatch Expiration 08/01/2017     ABO/Rh(D) B POSITIVE     Antibody screen NEG        Physical Exam:  Cervical Exam:  2/70/-2  Membranes:  Intact  Uterine Activity: Frequency: Every 3 minutes  Fetal Heart Rate: reactive    Assessment/Plan:  Patient Active Problem List   Diagnosis Code    Pre-eclampsia in third trimester O14.93    Obesity affecting pregnancy in third trimester O99.213     Cervidil removed, will start Cinthia Barclay MD

## 2017-07-30 NOTE — PROGRESS NOTES
1900 Received bedside report from [facility]. Reviewed plan of care with patient and partner. 2010 Notified anesthesia for assistance with IV placement. 2030 IV placed by anesthesia. 2040 Labs drawn at this time. 2100 Dr. Grant Saldaña at bedside for physical to include ultrasound to confirm fetal positioning. SVE: Cervidil placed at this time. SCDs to be placed during night. 2210 Patient resting in bed, reports itching across abdomen. Contacted Dr. Grant Saldaña via phone, orders received for hydrocortisone cream at this time. 2345 Hydrocortisone cream given to patient at this time. Encouraged patient to request staff assistance with any needs. Patient able to verbalize understanding. 3938 Assisted patient to bathroom at this time. 4472 Assisted patient to bathroom at this time. 4020 Reviewed plan of care for day with patient; offered bedside report if awake. Patient able to verbalize understanding. Assisted patient to bathroom at this time.

## 2017-07-30 NOTE — PROGRESS NOTES
Labor Progress Note  Cuauhtemoc Console is comfortable now. DIscussed IUPC and benefit for titration of pitocin. Vinod Mcdaniels agrees. Pelvic exam:       Cervical Exam  Dilation (cm): 2 (dr Jesús Mckeon)  Eff: 70 %  Station: -2  Membrane Status: SROM  FHTs 135 with moderate variability  UCs q 2 with pitocin at 10 mu    Visit Vitals    /83    Pulse 77    Temp 98.3 °F (36.8 °C)    Resp 18    Ht 5' 5\" (1.651 m)    Wt 118.8 kg (262 lb)    SpO2 99%    Breastfeeding No    BMI 43.6 kg/m2       Temp (24hrs), Av.1 °F (36.7 °C), Min:97.8 °F (36.6 °C), Max:98.5 °F (36.9 °C)      Recent Labs      17   2040   WBC  8.4   HGB  12.2           Assessment: Good pain management. Reassuring FHTs. Plan:Evaluate pitocin titration.   Sana Jones CNM  2017  7:35 PM

## 2017-07-30 NOTE — H&P
Obstetric Admission History and Physical    Name: Monet Ortez MRN: [id number] [id number]: [id number]    YOB: 1990  Age: 27 y.o. Sex: female       Subjective:      Chief complaint:    Chief Complaint   Patient presents with    Scheduled Induction       Bijan Young is a 27 y.o.  female, G 1 P 0 who presents at 36w4d gestation with Pre eclampsia. On admission EFM strip is obtained and is Category 1. On admission membranes are intact She is admitted for Induction of labor for: Pre-eclamptic toxemia of pregnancy. .    OB HISTORY  OB History      Para Term  AB Living    1         SAB TAB Ectopic Molar Multiple Live Births                   PAST MEDICAL HISTORY  Past Medical History:   Diagnosis Date    Arthritis     Chronic pain     KNEE & BACK    Genital herpes        PAST SURGICAL HISTORY  Past Surgical History:   Procedure Laterality Date    HX COLONOSCOPY      HX OTHER SURGICAL  1993(INFANT)    CYST REMOVED FROM LUNG       SOCIAL HISTORY  Social History     Social History    Marital status:      Spouse name: N/A    Number of children: N/A    Years of education: N/A     Occupational History    Not on file. Social History Main Topics    Smoking status: Former Smoker    Smokeless tobacco: Current User      Comment: E-CIGS    Alcohol use Yes      Comment: RARELY    Drug use: No    Sexual activity: Yes     Partners: Male     Other Topics Concern    Not on file     Social History Narrative       FAMILY HISTORY  Family History   Problem Relation Age of Onset    Hypertension Mother     Diabetes Father     Heart Disease Maternal Grandmother        ANTEPARTUM HISTORY: Prenatal care was/was not obtained at [address] Presented for care initially at 6 weeks gestation. Dates by 6 wks gestation for final STACIE of 8/10/17. Top pregnancy weight 262 for total weight gain of 41 lbs.   Prenatal course was complicated by  Recent edema, mildly elevated BP and proteinuria . ANTEPARTUM LABS: Blood Type B , RH pos, Rubella Imm, RPR NR, HbSag neg, HCV neg, Chlamydia neg, GC neg, HIV neg, , Hgb (28 wk) 11.5, GBS neg. ALLERGY:  No Known Allergies    HOME MEDICATIONS:  Prior to Admission medications    Medication Sig Start Date End Date Taking? Authorizing Provider   PNV Combo No.47-Iron-FA #1-DHA 27 mg iron-1 mg -300 mg cap Take 1 Tab by mouth daily. Indications: Pregnancy   Yes Historical Provider   valACYclovir (VALTREX) 500 mg tablet Take 500 mg by mouth daily. Indications: cold sores   Yes Historical Provider        Review of Systems:  A comprehensive review of systems was negative     Objective:     VITAL SIGNS:  Visit Vitals    /89    Pulse 85    Temp 98 °F (36.7 °C)    Resp 18    Ht 5' 5\" (1.651 m)    Wt 118.8 kg (262 lb)    Breastfeeding No    BMI 43.6 kg/m2       Physical Exam:  Patient without distress. Heart: Regular rate and rhythm  Lung: clear to auscultation throughout lung fields, no wheezes, no rales, no rhonchi and normal respiratory effort  Abdomen: soft, nontender  External Genitalia: normal general appearance and normal general appearance without lesions  Vagina: normal mucosa without prolapse or lesions and normal without tenderness, induration or masses  Cervix: closed/30%/-3  Extremities: extremities normal, atraumatic, no cyanosis  Trace edema   Neurologic: Grossly normal  DTR's+2/IV  Bedside US : vertex    Assessment:     1) 38 weeks 2d Intrauterine Pregnancy .   2) induction of labor  3) Pre eclampsia  4) GBS neg  5) Adequate pelvis for trial of labor      Plan:     Reassuring fetal status   Cervical ripening : cervidil placed  Patient understands and agrees with plan    Signed By:  Ida Santiago MD     July 29, 2017

## 2017-07-30 NOTE — ROUTINE PROCESS
Bedside and Verbal shift change report given to Izabella Malave RN   (oncoming nurse) by Elisabeth Robb (offgoing nurse). Report included the following information SBAR, Kardex, Intake/Output and MAR. Assumed care of patient introductions completed. Pt AOX3. Vitals and assessment completed WNL. Pain level  0/10. will continue to monitor. Whiteboard updated and nursing plan of care for shift explained. Patient verbalizes understanding and questions answered.     [id number] Dr Madonna Alves in room SVE and discussing POC

## 2017-07-30 NOTE — ANESTHESIA PREPROCEDURE EVALUATION
Anesthetic History   No history of anesthetic complications            Review of Systems / Medical History  Patient summary reviewed, nursing notes reviewed and pertinent labs reviewed    Pulmonary  Within defined limits                 Neuro/Psych   Within defined limits           Cardiovascular  Within defined limits                     GI/Hepatic/Renal                Endo/Other             Other Findings              Physical Exam    Airway  Mallampati: II  TM Distance: 4 - 6 cm  Neck ROM: normal range of motion   Mouth opening: Normal     Cardiovascular  Regular rate and rhythm,  S1 and S2 normal,  no murmur, click, rub, or gallop             Dental  No notable dental hx       Pulmonary  Breath sounds clear to auscultation               Abdominal  GI exam deferred       Other Findings            Anesthetic Plan    ASA: 2  Anesthesia type: epidural            Anesthetic plan and risks discussed with: Patient

## 2017-07-31 LAB — LDH SERPL L TO P-CCNC: 226 U/L (ref 81–234)

## 2017-07-31 PROCEDURE — 74011250637 HC RX REV CODE- 250/637

## 2017-07-31 PROCEDURE — 75410000003 HC RECOV DEL/VAG/CSECN EA 0.5 HR

## 2017-07-31 PROCEDURE — 75410000000 HC DELIVERY VAGINAL/SINGLE

## 2017-07-31 PROCEDURE — 74011250636 HC RX REV CODE- 250/636

## 2017-07-31 PROCEDURE — 0KQM0ZZ REPAIR PERINEUM MUSCLE, OPEN APPROACH: ICD-10-PCS | Performed by: OBSTETRICS & GYNECOLOGY

## 2017-07-31 PROCEDURE — 65270000029 HC RM PRIVATE

## 2017-07-31 PROCEDURE — 77030020255 HC SOL INJ LR 1000ML BG

## 2017-07-31 PROCEDURE — 76060000078 HC EPIDURAL ANESTHESIA

## 2017-07-31 PROCEDURE — 74011250637 HC RX REV CODE- 250/637: Performed by: OBSTETRICS & GYNECOLOGY

## 2017-07-31 PROCEDURE — 75410000002 HC LABOR FEE PER 1 HR

## 2017-07-31 RX ORDER — OXYCODONE AND ACETAMINOPHEN 5; 325 MG/1; MG/1
2 TABLET ORAL
Status: DISCONTINUED | OUTPATIENT
Start: 2017-07-31 | End: 2017-07-31 | Stop reason: SDUPTHER

## 2017-07-31 RX ORDER — MISOPROSTOL 200 UG/1
800 TABLET ORAL ONCE
Status: COMPLETED | OUTPATIENT
Start: 2017-07-31 | End: 2017-07-31

## 2017-07-31 RX ORDER — IBUPROFEN 400 MG/1
800 TABLET ORAL
Status: DISCONTINUED | OUTPATIENT
Start: 2017-07-31 | End: 2017-08-02 | Stop reason: HOSPADM

## 2017-07-31 RX ORDER — PROMETHAZINE HYDROCHLORIDE 25 MG/ML
25 INJECTION, SOLUTION INTRAMUSCULAR; INTRAVENOUS
Status: DISCONTINUED | OUTPATIENT
Start: 2017-07-31 | End: 2017-08-02 | Stop reason: HOSPADM

## 2017-07-31 RX ORDER — ZOLPIDEM TARTRATE 5 MG/1
5 TABLET ORAL
Status: DISCONTINUED | OUTPATIENT
Start: 2017-07-31 | End: 2017-07-31 | Stop reason: SDUPTHER

## 2017-07-31 RX ORDER — ACETAMINOPHEN 325 MG/1
650 TABLET ORAL
Status: DISCONTINUED | OUTPATIENT
Start: 2017-07-31 | End: 2017-07-31 | Stop reason: SDUPTHER

## 2017-07-31 RX ORDER — AMOXICILLIN 250 MG
1 CAPSULE ORAL
Status: DISCONTINUED | OUTPATIENT
Start: 2017-07-31 | End: 2017-07-31 | Stop reason: SDUPTHER

## 2017-07-31 RX ORDER — ZOLPIDEM TARTRATE 5 MG/1
5 TABLET ORAL
Status: DISCONTINUED | OUTPATIENT
Start: 2017-07-31 | End: 2017-08-02 | Stop reason: HOSPADM

## 2017-07-31 RX ORDER — OXYCODONE AND ACETAMINOPHEN 5; 325 MG/1; MG/1
2 TABLET ORAL
Status: DISCONTINUED | OUTPATIENT
Start: 2017-07-31 | End: 2017-08-02 | Stop reason: HOSPADM

## 2017-07-31 RX ORDER — IBUPROFEN 400 MG/1
800 TABLET ORAL
Status: DISCONTINUED | OUTPATIENT
Start: 2017-07-31 | End: 2017-07-31 | Stop reason: SDUPTHER

## 2017-07-31 RX ORDER — HYDROCORTISONE 25 MG/G
1 CREAM TOPICAL AS NEEDED
Status: DISCONTINUED | OUTPATIENT
Start: 2017-07-31 | End: 2017-08-02 | Stop reason: HOSPADM

## 2017-07-31 RX ORDER — AMOXICILLIN 250 MG
1 CAPSULE ORAL
Status: DISCONTINUED | OUTPATIENT
Start: 2017-07-31 | End: 2017-08-02 | Stop reason: HOSPADM

## 2017-07-31 RX ORDER — PROMETHAZINE HYDROCHLORIDE 25 MG/ML
25 INJECTION, SOLUTION INTRAMUSCULAR; INTRAVENOUS
Status: DISCONTINUED | OUTPATIENT
Start: 2017-07-31 | End: 2017-07-31 | Stop reason: SDUPTHER

## 2017-07-31 RX ORDER — ACETAMINOPHEN 325 MG/1
650 TABLET ORAL
Status: DISCONTINUED | OUTPATIENT
Start: 2017-07-31 | End: 2017-08-02 | Stop reason: HOSPADM

## 2017-07-31 RX ADMIN — MISOPROSTOL 800 MCG: 200 TABLET ORAL at 03:05

## 2017-07-31 RX ADMIN — CASTOR OIL: 100 LIQUID ORAL at 02:30

## 2017-07-31 RX ADMIN — Medication 20000 MILLI-UNITS: at 02:47

## 2017-07-31 RX ADMIN — IBUPROFEN 800 MG: 400 TABLET, FILM COATED ORAL at 12:48

## 2017-07-31 RX ADMIN — VALACYCLOVIR HYDROCHLORIDE 500 MG: 500 TABLET, FILM COATED ORAL at 09:00

## 2017-07-31 RX ADMIN — IBUPROFEN 800 MG: 400 TABLET, FILM COATED ORAL at 04:20

## 2017-07-31 RX ADMIN — IBUPROFEN 800 MG: 400 TABLET, FILM COATED ORAL at 21:12

## 2017-07-31 NOTE — PROGRESS NOTES
Labor Progress Note  Jet Kiran finally able to feel/push after strong epidural bolus and is pushing well. Baby at +2.  Epidural down to 5 by bedside nurse. Pelvic exam:       Cervical Exam  Dilation (cm): 10  Eff: 90 %  Station: 0  Membrane Status: SROM  FHTs 160s with moderate variability and variables  Previous period of late decelerations noted and FSE planned. [facility] q 2-3    Visit Vitals    /64    Pulse 91    Temp 98.7 °F (37.1 °C)    Resp 16    Ht 5' 5\" (1.651 m)    Wt 118.8 kg (262 lb)    SpO2 100%    Breastfeeding No    BMI 43.6 kg/m2       Temp (24hrs), Av.4 °F (36.9 °C), Min:97.9 °F (36.6 °C), Max:99.3 °F (37.4 °C)      Recent Labs      17   2040   WBC  8.4   HGB  12.2           Assessment: Second stage. Late decelerations noted. Plan:FSE now and update Dr Jermain Braun.     Shellie Chow CNM  2017  1:49 AM

## 2017-07-31 NOTE — PROGRESS NOTES
Baby announcement.     [address] [facility]   Staff [address] [facility]   [phone]

## 2017-07-31 NOTE — ANESTHESIA POSTPROCEDURE EVALUATION
Post-Anesthesia Evaluation & Assessment    Visit Vitals    BP (!) 142/96 (BP 1 Location: Right arm)    Pulse 80    Temp 37 °C (98.6 °F)    Resp 16    Ht 5' 5\" (1.651 m)    Wt 118.8 kg (262 lb)    SpO2 97%    Breastfeeding Unknown    BMI 43.6 kg/m2       Nausea/Vomiting: no nausea    Pain score (VAS): 0    Post-operative hydration adequate.     Mental status & Level of consciousness: orientation per pre-anesthetic level    Neurological status: moves all extremities, sensation grossly intact    Pulmonary status: airway patent, no supplemental oxygen required    Complications related to anesthesia: none    Additional comments:        Veronica Segura CRNA  July 31, 2017

## 2017-07-31 NOTE — PROGRESS NOTES
Patient doing well. Ambulating without complaints. Voiding without problems. Pain managed well with motrin. Bonding well with baby.

## 2017-07-31 NOTE — LACTATION NOTE
Mother states baby has nursed well one time since delivery 12 hours ago. Discussed latch, positioning, feeding frequency, wet/dirty diapers, colustrum, size of tummy, milk coming in and nipple care. Gave BF information and daily log. Offered assistance if needed. Encouraged to call later if needed.

## 2017-07-31 NOTE — ROUTINE PROCESS
TRANSFER - IN REPORT:    Verbal report received from KEVYN Rodriguez RN(name) on Leeroy Numbers  being received from L/D(unit) for routine progression of care      Report consisted of patients Situation, Background, Assessment and   Recommendations(SBAR). Information from the following report(s) SBAR, Procedure Summary, Intake/Output, MAR and Recent Results was reviewed with the receiving nurse. Opportunity for questions and clarification was provided. Assessment completed upon patients arrival to unit and care assumed.

## 2017-07-31 NOTE — PROGRESS NOTES
Labor Progress Note  Armando Syed is finally comfortable with the last top off. Will rest now (dozing already) then begin pushing. Very numb now. Pelvic exam:       Cervical Exam  Dilation (cm): 10  Eff: 90 %  Station: 0  Membrane Status: SROM  FHTs reactive from 160  UCs q 3 with pitocin at 2 mu. Visit Vitals    /82    Pulse (!) 103    Temp 98.1 °F (36.7 °C)    Resp 18    Ht 5' 5\" (1.651 m)    Wt 118.8 kg (262 lb)    SpO2 99%    Breastfeeding No    BMI 43.6 kg/m2       Temp (24hrs), Av.1 °F (36.7 °C), Min:97.8 °F (36.6 °C), Max:98.5 °F (36.9 °C)      Recent Labs      17   2040   WBC  8.4   HGB  12.2        Assessment: Good pain management. Reassuring FHTs at upper limit of normal.  Plan: Labor down while bolus wears off a bit then begin pushing.    Lupe Rubio CNM  2017  10:41 PM

## 2017-07-31 NOTE — L&D DELIVERY NOTE
Delivery Summary    Patient: Deysi Nuno MRN: [id number]  SSN: [id number]    YOB: 1990  Age: 27 y.o. Sex: female        Labor Events:    Labor: No    Rupture Date: 2017    Rupture Time: 5:52 PM    Rupture Type SROM    Amniotic Fluid Volume:      Amniotic Fluid Description: Clear  None    Induction: Prostaglandins        Augmentation: Oxytocin    Labor Complications: None     Additional Complications:        Cervical Ripenin2017  10:00 PM  Cervidil      Delivery Events:  Episiotomy: None    Laceration(s): Second degree perineal      Repaired: Yes     Number of Repair Packets: 2    Suture Type and Size:         Estimated Blood Loss (ml): 350      Called to evaluate bleeding with pushing. Small laceration on perineum noted. Bleeding appears to be coming from pelvic floor. Pushes head to +2 and rests at 0 station. Improves with encouragement. Controlled spontaneous vaginal delivery of a viable female infant. Infant to maternal abdomen for tactile stimulation. Cord clamped for cut by dad and baby to warmer for evaluation. Cord gasses collected. See pediatric chart. Peds to the room. Apgars 6/9. Placenta delivered spontaneously and intact with a 3VC via [address] [place] mechanism. Deep second degree laceration with long perineal extension repaired with 3-0 vicryl under the anesthetic of the epidural.  EBL 350cc. Baby to nursery for evaluation. Mother stable and recovering in the LDR. Dr Madonna Alves here and aware of the birth.  Johnson Sandifer, CNM    Information for the patient's :  Rosie Merida [[id number]]     Delivery Summary - Baby    Delivery Date: 2017   Delivery Time: 2:47 AM   Delivery Type: Vaginal, Spontaneous Delivery  Sex:  female  Gestational Age: 38w4d  Delivery Clinician:  Johnson Sandifer  Living?: Living   Delivery Location: L&D             APGARS  One minute Five minutes Ten minutes   Skin Color: 0    1       Heart Rate: 1   2         Reflex Irritability: 2   2         Muscle Tone: 1   2       Respiration: 2   2         Total: 6   9           Presentation: Vertex  Position:   Occiput Anterior  Resuscitation Method:  Suctioning-deep; Tactile Stimulation     Meconium Stained:      Cord Information: 3 Vessels   Complications: Nuchal Cord With Compressions  Cord Blood Sent?:  Yes    Blood Gases Sent?:  Yes    Placenta:  Date/Time:   2:51 AM  Removal: Spontaneous      Appearance: Normal     [place] Measurements:  Birth Weight:      Birth Length:     Head Circumference:       Chest Circumference:      Abdominal Girth:       Other Providers:   ;CHAIM Dias;;;ESTEFANIA FLORES;STEFANO ALMENDAREZ;;Jamee WILDER Obstetrician;Primary Nurse;Primary [place] Nurse;Nicu Nurse;Neonatologist;Pediatrician;Crna;Nurse Practitioner;Midwife;Nursery Nurse           Cord Blood Results:  Information for the patient's :  Avelina Revelesfast [[id number]]   No results found for: ABORH, PCTABR, PCTDIG, BILI, ABORHEXT, ABORH    Information for the patient's :  Avelina Bellwood [[id number]]   No results found for: APH, APCO2, APO2, AHCO3, ABEC, ABDC, O2ST, SITE, [place], PHI, Luann, PO2I, HCO3I, SO2I, IBD     Information for the patient's :  Avelina Bellwood [[id number]]   No results found for: EPHV, PCO2V, PO2V, HCO3V, O2STV, EBDV

## 2017-07-31 NOTE — PROGRESS NOTES
0700Recieved report from offgoing nurse SOLIS Nieto rn to oncoming nurse JOSTIN Thompson rn via sbar at bedside. 0930 Encouraged shower, ice to perineium. 1215 lunch served ,patient resting, declines any needs. 1300 Encouraged shower again to promote comfort\"will later\"

## 2017-07-31 NOTE — ROUTINE PROCESS
Verbal shift change report given to Lindsay Amadro RN (oncoming nurse) by Laquita Aly RN (offgoing nurse). Report included the following information SBAR, Kardex, Intake/Output, MAR and Recent Results.

## 2017-08-01 LAB
HCT VFR BLD AUTO: 31.5 % (ref 35–45)
HGB BLD-MCNC: 10.6 G/DL (ref 12–16)

## 2017-08-01 PROCEDURE — 74011250637 HC RX REV CODE- 250/637: Performed by: OBSTETRICS & GYNECOLOGY

## 2017-08-01 PROCEDURE — 36415 COLL VENOUS BLD VENIPUNCTURE: CPT | Performed by: ADVANCED PRACTICE MIDWIFE

## 2017-08-01 PROCEDURE — 85014 HEMATOCRIT: CPT | Performed by: ADVANCED PRACTICE MIDWIFE

## 2017-08-01 PROCEDURE — 65270000029 HC RM PRIVATE

## 2017-08-01 PROCEDURE — 85018 HEMOGLOBIN: CPT | Performed by: ADVANCED PRACTICE MIDWIFE

## 2017-08-01 PROCEDURE — 74011250637 HC RX REV CODE- 250/637: Performed by: ADVANCED PRACTICE MIDWIFE

## 2017-08-01 RX ADMIN — DOCUSATE SODIUM AND SENNOSIDES 1 TABLET: 8.6; 5 TABLET, FILM COATED ORAL at 21:03

## 2017-08-01 RX ADMIN — HYDROCORTISONE: 1 CREAM TOPICAL at 15:42

## 2017-08-01 RX ADMIN — Medication 1 SPRAY: at 09:42

## 2017-08-01 RX ADMIN — IBUPROFEN 800 MG: 400 TABLET, FILM COATED ORAL at 18:49

## 2017-08-01 RX ADMIN — IBUPROFEN 800 MG: 400 TABLET, FILM COATED ORAL at 06:36

## 2017-08-01 RX ADMIN — DOCUSATE SODIUM AND SENNOSIDES 1 TABLET: 8.6; 5 TABLET, FILM COATED ORAL at 09:42

## 2017-08-01 RX ADMIN — VALACYCLOVIR HYDROCHLORIDE 500 MG: 500 TABLET, FILM COATED ORAL at 09:42

## 2017-08-01 NOTE — ROUTINE PROCESS
Bedside and Verbal shift change report given to Rosemary Villagomez RN (oncoming nurse) by Rosemary Villagomez RN (offgoing nurse). Report included the following information SBAR, Procedure Summary, Intake/Output, MAR and Recent Results.

## 2017-08-01 NOTE — LACTATION NOTE
This note was copied from a baby's chart. Mom states baby is nursing well when she wants to. Reviewed nursing pattern/expectations. No questions at this time.

## 2017-08-01 NOTE — ROUTINE PROCESS
Bedside and Verbal shift change report given to Ton Eason RN (oncoming nurse) by Fausto Bates RN (offgoing nurse). Report included the following information Kardex, Intake/Output, MAR and Recent Results. 0920--assessment done--voiding without difficulty--denies weakness when up--shower encouraged--reports breast feeding is going fairly well--effective pain management with Motrin--comfortable at present--plans to remain until tomorrow.

## 2017-08-01 NOTE — PROGRESS NOTES
Post-Partum Day Number 1  Progress Note    Patient doing well post-partum without significant complaints. Voiding without difficulty, normal lochia. Breast feeding well. Emotionally stable. Breastfeeding: Infant Feeding: Breastmilk  Vitals:  Patient Vitals for the past 8 hrs:   BP Temp Pulse Resp SpO2   17 0414 119/70 98.1 °F (36.7 °C) 79 16 97 %     Temp (24hrs), Av.5 °F (36.9 °C), Min:98.1 °F (36.7 °C), Max:99 °F (37.2 °C)      Vital signs stable, afebrile. Exam:  Patient is in good general condition. Emotionally: appears to be stable  Fundus:  Firm and not tender. Lower extremities are negative for swelling, cords or tenderness. Lab/Data Review:  CBC: Lab Results   Component Value Date/Time    WBC 8.4 2017 08:40 PM    RBC 4.00 2017 08:40 PM    HGB 10.6 2017 06:20 AM    HCT 31.5 2017 06:20 AM    PLATELET 313  08:40 PM    Hgb, External 13.5 2016    Hct, External 39.9 2016    Platelet cnt., External 288 2016     Lab results reviewed. For significant abnormal values and values requiring intervention, see assessment and plan. Assessment:Post Partum Day number 1 PT doing well. Plan:    Continue routine perineal care and maternal education. Plan discharge tomorrow if no problems occur.     Education:  Post partum instructions discussed                John Guzman MD  2017

## 2017-08-02 VITALS
BODY MASS INDEX: 43.65 KG/M2 | RESPIRATION RATE: 18 BRPM | OXYGEN SATURATION: 98 % | TEMPERATURE: 98.2 F | DIASTOLIC BLOOD PRESSURE: 87 MMHG | HEIGHT: 65 IN | WEIGHT: 262 LBS | SYSTOLIC BLOOD PRESSURE: 143 MMHG | HEART RATE: 95 BPM

## 2017-08-02 PROCEDURE — 74011250637 HC RX REV CODE- 250/637: Performed by: OBSTETRICS & GYNECOLOGY

## 2017-08-02 RX ORDER — IBUPROFEN 800 MG/1
800 TABLET ORAL
Qty: 60 TAB | Refills: 1 | Status: SHIPPED | OUTPATIENT
Start: 2017-08-02

## 2017-08-02 RX ADMIN — VALACYCLOVIR HYDROCHLORIDE 500 MG: 500 TABLET, FILM COATED ORAL at 10:49

## 2017-08-02 RX ADMIN — IBUPROFEN 800 MG: 400 TABLET, FILM COATED ORAL at 02:10

## 2017-08-02 RX ADMIN — Medication 1 SPRAY: at 10:49

## 2017-08-02 RX ADMIN — DOCUSATE SODIUM AND SENNOSIDES 1 TABLET: 8.6; 5 TABLET, FILM COATED ORAL at 10:49

## 2017-08-02 NOTE — DISCHARGE INSTRUCTIONS

## 2017-08-02 NOTE — DISCHARGE SUMMARY
[address] [facility] [address]  [address]  [phone]       Obstetrical Discharge Summary     Patient ID:  Evans Friend  [id number]  53 y.o.  1990    Admit Date: 2017    Discharge Date: 2017     Admitting Physician: Sandhya Damon MD     Admission Diagnoses: Maternity  Pre-eclampsia in third trimester    Final Diagnosis: [email or web address] Delivered    Additional Diagnoses:Present on Admission:  **None**         OB History      Para Term  AB Living    1 1 1   1    SAB TAB Ectopic Molar Multiple Live Births        0 1        Lab Results   Component Value Date/Time    Rubella, External IMMUNE 2016    GrBStrep, External NEGATIVE 2017       Baby link  Information for the patient's :  Josephine Munson [[id number]]     Delivery Type: Vaginal, Spontaneous Delivery   Delivery Date: 2017   Delivery Time: 2:47 AM     Birth Weight: 3.2 kg     Sex:  female  Delivery Clinician:  Rafia Mar   Gestational Age: Gestational Age: 38w3d    Presentation: Vertex   Position:    Occiput  Anterior     Apgars were 6  and 9      Resuscitation Method: Suctioning-deep; Tactile Stimulation     Meconium Stained:    Living Status: Living       Placenta Date/Time:   2:51 AM   Placenta Removal: Spontaneous   Placenta Appearance: Vertex [1]     Cord Information: 3 Vessels    Cord Events: Nuchal Cord With Compressions       Cord Blood Sent?:  Yes    Blood Gases Sent?:  Yes      Infant Information:   Information for the patient's :  Josephine Munson [[id number]]   One Minute Apgar: 6 (Filed from Delivery Summary)  Five  Minute Apgar: 9 (Filed from Delivery Summary)     Baby Procedures:    Information for the patient's :  Josephine Munson [[id number]]        Diet:  Regular  Feeding Method: Infant Feeding: Breastmilk    Vitals:  No data found. Temp (24hrs), Av.6 °F (37 °C), Min:98.1 °F (36.7 °C), Max:99 °F (37.2 °C)      Vital signs stable, afebrile.     Exam: Patient is in good general condition. Emotionally: appears to be stable  Fundus:  Firm and not tender. Lower extremities are negative for swelling, cords or tenderness. Lab/Data Review:  CBC:   Lab Results   Component Value Date/Time    WBC 8.4 07/29/2017 08:40 PM    RBC 4.00 07/29/2017 08:40 PM    HGB 10.6 08/01/2017 06:20 AM    HCT 31.5 08/01/2017 06:20 AM    PLATELET 958 98/54/7036 08:40 PM    Hgb, External 13.5 12/16/2016    Hct, External 39.9 12/16/2016    Platelet cnt., External 288 12/16/2016     Lab results reviewed. For significant abnormal values and values requiring intervention, see assessment and plan. Activity: as tolerated    Discharge Instructions: Nothing in vagina, no heavy lifting or exercise for 6 weeks. No driving for 1 week or while taking narcotics. SITZ bath for perineal discomfort. F/U 6 weeks post partum check. Call for heavy bleeding, temperature over 101 degrees, heavy vaginal bleeding, foul vaginal odor or worsening abdominal pain. Medications:   Current Discharge Medication List      START taking these medications    Details   ibuprofen (MOTRIN) 800 mg tablet Take 1 Tab by mouth every eight (8) hours as needed. Qty: 60 Tab, Refills: 1         CONTINUE these medications which have NOT CHANGED    Details   PNV Combo No.47-Iron-FA #1-DHA 27 mg iron-1 mg -300 mg cap Take 1 Tab by mouth daily. Indications: Pregnancy         STOP taking these medications       valACYclovir (VALTREX) 500 mg tablet Comments:   Reason for Stopping:                 Condition at discharge: Stable and doing well.   Disposition: Home    August 2, 2017  [employer] [place]

## 2017-08-02 NOTE — ROUTINE PROCESS
Verbal shift change report given to Ton Eason RN (oncoming nurse) by Conchita Otero RN (offgoing nurse). Report included the following information Kardex, Intake/Output, MAR and Recent Results. 1020--assessment done--planning discharge today--voiding without difficulty--denies weakness when up--has had BM--effective pain management with Motrin--breast feeding is going fairly well--B/P elevated--will recheck--POC for discharge discussed--verbalizes understanding--questioned her need to continue Valtrex at home--will discuss with DEYANIRA. 1030--discussed latest B/P's and Valtrex issue with Gali Gillette CNM--patient given a 1 week appointment for B/P check an informed to continue Valtrex. 1100--instructed on use of Sitz Bath for use at home--verbalizes understanding  1130--discharge instructions reviewed and signed. 1205--discharged via wheelchair with baby in car seat carrier. Baby transported home in a car seat.

## 2022-03-18 PROBLEM — O14.93 PRE-ECLAMPSIA IN THIRD TRIMESTER: Status: ACTIVE | Noted: 2017-07-29

## 2022-03-19 PROBLEM — O99.213 OBESITY AFFECTING PREGNANCY IN THIRD TRIMESTER: Status: ACTIVE | Noted: 2017-07-29

## 2022-10-29 ENCOUNTER — HOSPITAL ENCOUNTER (EMERGENCY)
Age: 32
Discharge: HOME | End: 2022-10-30
Attending: EMERGENCY MEDICINE
Payer: OTHER GOVERNMENT

## 2022-10-29 ENCOUNTER — APPOINTMENT (OUTPATIENT)
Dept: CT IMAGING | Age: 32
End: 2022-10-29
Attending: PHYSICIAN ASSISTANT
Payer: OTHER GOVERNMENT

## 2022-10-29 DIAGNOSIS — R53.83 FATIGUE, UNSPECIFIED TYPE: ICD-10-CM

## 2022-10-29 DIAGNOSIS — R10.84 ABDOMINAL PAIN, GENERALIZED: Primary | ICD-10-CM

## 2022-10-29 PROCEDURE — 99285 EMERGENCY DEPT VISIT HI MDM: CPT

## 2022-10-29 PROCEDURE — 85025 COMPLETE CBC W/AUTO DIFF WBC: CPT

## 2022-10-29 PROCEDURE — 80053 COMPREHEN METABOLIC PANEL: CPT

## 2022-10-29 PROCEDURE — 74177 CT ABD & PELVIS W/CONTRAST: CPT

## 2022-10-30 VITALS
HEIGHT: 65 IN | DIASTOLIC BLOOD PRESSURE: 81 MMHG | SYSTOLIC BLOOD PRESSURE: 117 MMHG | RESPIRATION RATE: 16 BRPM | OXYGEN SATURATION: 97 % | HEART RATE: 92 BPM | BODY MASS INDEX: 35.82 KG/M2 | WEIGHT: 215 LBS | TEMPERATURE: 97.9 F

## 2022-10-30 LAB
ALBUMIN SERPL-MCNC: 3.4 G/DL (ref 3.4–5)
ALBUMIN/GLOB SERPL: 1.1 {RATIO} (ref 0.8–1.7)
ALP SERPL-CCNC: 94 U/L (ref 45–117)
ALT SERPL-CCNC: 58 U/L (ref 13–56)
ANION GAP SERPL CALC-SCNC: 3 MMOL/L (ref 3–18)
AST SERPL-CCNC: 51 U/L (ref 10–38)
BASOPHILS # BLD: 0.1 K/UL (ref 0–0.1)
BASOPHILS NFR BLD: 1 % (ref 0–2)
BILIRUB SERPL-MCNC: 0.5 MG/DL (ref 0.2–1)
BUN SERPL-MCNC: 12 MG/DL (ref 7–18)
BUN/CREAT SERPL: 13 (ref 12–20)
CALCIUM SERPL-MCNC: 9.3 MG/DL (ref 8.5–10.1)
CHLORIDE SERPL-SCNC: 102 MMOL/L (ref 100–111)
CO2 SERPL-SCNC: 30 MMOL/L (ref 21–32)
CREAT SERPL-MCNC: 0.9 MG/DL (ref 0.6–1.3)
DIFFERENTIAL METHOD BLD: ABNORMAL
EOSINOPHIL # BLD: 0.1 K/UL (ref 0–0.4)
EOSINOPHIL NFR BLD: 1 % (ref 0–5)
ERYTHROCYTE [DISTWIDTH] IN BLOOD BY AUTOMATED COUNT: 11.7 % (ref 11.6–14.5)
GLOBULIN SER CALC-MCNC: 3 G/DL (ref 2–4)
GLUCOSE SERPL-MCNC: 72 MG/DL (ref 74–99)
HCT VFR BLD AUTO: 42.4 % (ref 35–45)
HGB BLD-MCNC: 14.8 G/DL (ref 12–16)
IMM GRANULOCYTES # BLD AUTO: 0.1 K/UL (ref 0–0.04)
IMM GRANULOCYTES NFR BLD AUTO: 1 % (ref 0–0.5)
LYMPHOCYTES # BLD: 2.5 K/UL (ref 0.9–3.6)
LYMPHOCYTES NFR BLD: 34 % (ref 21–52)
MCH RBC QN AUTO: 30.4 PG (ref 24–34)
MCHC RBC AUTO-ENTMCNC: 34.9 G/DL (ref 31–37)
MCV RBC AUTO: 87.1 FL (ref 78–100)
MONOCYTES # BLD: 0.6 K/UL (ref 0.05–1.2)
MONOCYTES NFR BLD: 8 % (ref 3–10)
NEUTS SEG # BLD: 4 K/UL (ref 1.8–8)
NEUTS SEG NFR BLD: 55 % (ref 40–73)
NRBC # BLD: 0 K/UL (ref 0–0.01)
NRBC BLD-RTO: 0 PER 100 WBC
PLATELET # BLD AUTO: 291 K/UL (ref 135–420)
PMV BLD AUTO: 8.5 FL (ref 9.2–11.8)
POTASSIUM SERPL-SCNC: 5.2 MMOL/L (ref 3.5–5.5)
PROT SERPL-MCNC: 6.4 G/DL (ref 6.4–8.2)
RBC # BLD AUTO: 4.87 M/UL (ref 4.2–5.3)
SODIUM SERPL-SCNC: 135 MMOL/L (ref 136–145)
WBC # BLD AUTO: 7.3 K/UL (ref 4.6–13.2)

## 2022-10-30 PROCEDURE — 96374 THER/PROPH/DIAG INJ IV PUSH: CPT

## 2022-10-30 PROCEDURE — 74011250636 HC RX REV CODE- 250/636: Performed by: EMERGENCY MEDICINE

## 2022-10-30 PROCEDURE — 74011000636 HC RX REV CODE- 636: Performed by: PHYSICIAN ASSISTANT

## 2022-10-30 RX ORDER — KETOROLAC TROMETHAMINE 15 MG/ML
15 INJECTION, SOLUTION INTRAMUSCULAR; INTRAVENOUS
Status: COMPLETED | OUTPATIENT
Start: 2022-10-30 | End: 2022-10-30

## 2022-10-30 RX ADMIN — KETOROLAC TROMETHAMINE 15 MG: 15 INJECTION, SOLUTION INTRAMUSCULAR; INTRAVENOUS at 02:20

## 2022-10-30 RX ADMIN — IOPAMIDOL 100 ML: 612 INJECTION, SOLUTION INTRAVENOUS at 00:38

## 2022-10-30 NOTE — ED TRIAGE NOTES
Pt to ED with complaints of bruising to abdomen that started last night after falling out of chair. Pt also reports having surgery for fallopian tubes removed 4 days ago.

## 2022-10-30 NOTE — DISCHARGE INSTRUCTIONS
The CAT scan shows no evidence of bleeding so please follow closely with your primary doctor and return if you are at all worsened or concerned.

## 2022-10-30 NOTE — ED PROVIDER NOTES
EMERGENCY DEPARTMENT HISTORY AND PHYSICAL EXAM    11:36 PM      Date: 10/29/2022  Patient Name: Suzzette Bumpers    History of Presenting Illness     Chief Complaint   Patient presents with    Fall    Bleeding/Bruising         History Provided By: Patient  Location/Duration/Severity/Modifying factors   Patient is a 68-year-old female with a history of laparoscopic pelvic surgery, arthritis, genital herpes, GERD, anxiety, the presents emergency department with a complaint of headache, abdominal pain, and increased bruising of her abdomen after a fall yesterday. The patient had surgery 4 days prior and then yesterday was going to sit down in a desk chair and fell to the ground. She not hit her head did not pass out however complained of some abdominal pain this been increasing since then. Patient been feeling lightheaded throughout the day increasing her concerned maybe she had some bleeding. There are no other known aggravating or alleviating factors. Patient denies passing out. Patient denies any neck pain. Patient notes a mild headache as well. Patient is not a current smoker, occasionally drinks alcohol, denies any drug use. Patient is an  for the [employer]. PCP: None    Current Facility-Administered Medications   Medication Dose Route Frequency Provider Last Rate Last Admin    iopamidoL (ISOVUE 300) 61 % contrast injection  mL   mL IntraVENous RAD ONCE Alyssia Hung PA-C         Current Outpatient Medications   Medication Sig Dispense Refill    oxyCODONE-acetaminophen (PERCOCET) 5-325 mg per tablet Take 1 Tablet by mouth every six (6) hours as needed for Pain for up to 5 days. Max Daily Amount: 4 Tablets. 12 Tablet 0    sertraline (ZOLOFT) 50 mg tablet Take  by mouth daily. cloNIDine HCL (CATAPRES) 0.1 mg tablet Take  by mouth nightly. lisdexamfetamine (VYVANSE) 50 mg cap Take  by mouth daily. am      Cetirizine (ZyrTEC) 10 mg cap Take  by mouth as needed. ibuprofen (MOTRIN) 800 mg tablet Take 1 Tab by mouth every eight (8) hours as needed. 61 Tab 1       Past History     Past Medical History:  Past Medical History:   Diagnosis Date    Anxiety     Arthritis     Chronic pain     KNEE & BACK    Genital herpes     GERD (gastroesophageal reflux disease)        Past Surgical History:  Past Surgical History:   Procedure Laterality Date    HX COLONOSCOPY      HX GYN      scar tissue in vagina    HX OTHER SURGICAL  1993([facility])    CYST REMOVED FROM LUNG       Family History:  Family History   Problem Relation Age of Onset    Hypertension Mother     Diabetes Father     Heart Disease Maternal Grandmother        Social History:  Social History     Tobacco Use    Smoking status: Former    Smokeless tobacco: Current    Tobacco comments:     E-CIGS   Substance Use Topics    Alcohol use: Yes     Comment: RARELY    Drug use: No       Allergies:  No Known Allergies      Review of Systems       Review of Systems   Constitutional:  Positive for fatigue. Negative for activity change and fever. HENT:  Negative for congestion and rhinorrhea. Eyes:  Negative for visual disturbance. Respiratory:  Negative for shortness of breath. Cardiovascular:  Negative for chest pain and palpitations. Gastrointestinal:  Positive for abdominal pain. Negative for diarrhea, nausea and vomiting. Genitourinary:  Negative for dysuria and hematuria. Musculoskeletal:  Negative for back pain. Skin:  Positive for wound. Negative for rash. Neurological:  Positive for headaches. Negative for dizziness, weakness and light-headedness. All other systems reviewed and are negative. Physical Exam   Visit Vitals  BP (!) 130/119 (BP 1 Location: Left upper arm, BP Patient Position: At rest)   Pulse (!) 104   Temp 97.9 °F (36.6 °C)   Resp 16   Ht 5' 5\" (1.651 m)   Wt 97.5 kg (215 lb)   SpO2 97%   BMI 35.78 kg/m²         Physical Exam  Vitals and nursing note reviewed.    Constitutional:       General: She is not in acute distress. Appearance: She is well-developed. HENT:      Head: Normocephalic and atraumatic. Right Ear: External ear normal.      Left Ear: External ear normal.      Nose: Nose normal.   Eyes:      General: No scleral icterus. Conjunctiva/sclera: Conjunctivae normal.      Pupils: Pupils are equal, round, and reactive to light. Neck:      Thyroid: No thyromegaly. Vascular: No JVD. Trachea: No tracheal deviation. Cardiovascular:      Rate and Rhythm: Normal rate and regular rhythm. Heart sounds: Normal heart sounds. No murmur heard. No friction rub. No gallop. Pulmonary:      Effort: Pulmonary effort is normal.      Breath sounds: Normal breath sounds. Chest:      Chest wall: No tenderness. Abdominal:      General: Bowel sounds are normal. There is no distension. Palpations: Abdomen is soft. Tenderness: There is no abdominal tenderness. There is no guarding or rebound. Comments: Diffuse mild tenderness, ecchymosis around the port sites, no peritoneal signs   Musculoskeletal:         General: No tenderness. Normal range of motion. Cervical back: Normal range of motion and neck supple. Lymphadenopathy:      Cervical: No cervical adenopathy. Skin:     General: Skin is warm and dry. Neurological:      Mental Status: She is alert and oriented to person, place, and time. Cranial Nerves: No cranial nerve deficit. Coordination: Coordination normal.      Comments: No sensory loss, Gait normal, Motor 5/5   Psychiatric:         Behavior: Behavior normal.         Thought Content: Thought content normal.         Judgment: Judgment normal.         Diagnostic Study Results     Labs -  No results found for this or any previous visit (from the past 12 hour(s)). Radiologic Studies -   CT ABD PELV W CONT   Final Result   1. No acute pathology appreciated in the abdomen or pelvis.       2.  Subcutaneous emphysema and free air in the pelvis consistent with recent   surgery. 3.  Left adnexal cyst.            Medical Decision Making   I am the first provider for this patient. I reviewed the vital signs, available nursing notes, past medical history, past surgical history, family history and social history. Vital Signs-Reviewed the patient's vital signs. Records Reviewed: Nursing Notes, Old Medical Records, Previous Radiology Studies, and Previous Laboratory Studies (Time of Review: 11:36 PM)    ED Course: Progress Notes, Reevaluation, and Consults: The patient CT scan is reassuring and discussed this with the patient and will proceed with close outpatient care. Do not suspect active bleeding or acute complication of her surgery, vital signs improved at the time of discharge. The patient will return if at all worsened or concerned. Workup and recommendations were reviewed with the patient and all questions were answered. The patient understands the plan and will proceed with close outpatient care. I have encouraged the patient to return if at all worsened or concerned. Felipa Cowden, DO 4:45 PM      Provider Notes (Medical Decision Making):   MDM  Number of Diagnoses or Management Options  Abdominal pain, generalized  Fatigue, unspecified type  Diagnosis management comments: Patient is a 82-year-old female with a history of arthritis, chronic joint pain, GERD, anxiety, status post pelvic laparoscopic surgery with bilateral salpingectomy done on 10/25/2022 [facility] that presents with complaint of abdominal pain, mild lightheadedness, headache, after a fall yesterday. Patient is concerned that she has increasing bruising around her surgical sites and the fall could cause some internal bleeding. The patient has diffuse pain however no rigidity or distention so we will proceed with a CT of the abdomen to look for any free fluid though be new, follow her hemoglobin, and then reevaluate.   Patient is currently taking oxycodone for pain. Kelly Edwards DO 11:41 PM          Procedures          Diagnosis     Clinical Impression:   1. Abdominal pain, generalized    2. Fatigue, unspecified type        Disposition: DC    Follow-up Information    None          Patient's Medications   Start Taking    No medications on file   Continue Taking    CETIRIZINE (ZYRTEC) 10 MG CAP    Take  by mouth as needed. CLONIDINE HCL (CATAPRES) 0.1 MG TABLET    Take  by mouth nightly. IBUPROFEN (MOTRIN) 800 MG TABLET    Take 1 Tab by mouth every eight (8) hours as needed. LISDEXAMFETAMINE (VYVANSE) 50 MG CAP    Take  by mouth daily. am    OXYCODONE-ACETAMINOPHEN (PERCOCET) 5-325 MG PER TABLET    Take 1 Tablet by mouth every six (6) hours as needed for Pain for up to 5 days. Max Daily Amount: 4 Tablets. SERTRALINE (ZOLOFT) 50 MG TABLET    Take  by mouth daily. These Medications have changed    No medications on file   Stop Taking    No medications on file     Disclaimer: Sections of this note are dictated using utilizing voice recognition software. Minor typographical errors may be present. If questions arise, please do not hesitate to contact me or call our department.

## 2023-10-12 ENCOUNTER — OFFICE VISIT (OUTPATIENT)
Age: 33
End: 2023-10-12
Payer: OTHER GOVERNMENT

## 2023-10-12 VITALS — WEIGHT: 250.8 LBS | BODY MASS INDEX: 41.79 KG/M2 | TEMPERATURE: 98.6 F | HEIGHT: 65 IN

## 2023-10-12 DIAGNOSIS — M22.42 CHONDROMALACIA, PATELLA, LEFT: Primary | ICD-10-CM

## 2023-10-12 DIAGNOSIS — M25.362 INSTABILITY OF LEFT KNEE JOINT: ICD-10-CM

## 2023-10-12 PROCEDURE — 99203 OFFICE O/P NEW LOW 30 MIN: CPT | Performed by: SPECIALIST

## 2023-10-12 RX ORDER — CLOBETASOL PROPIONATE 0.5 MG/G
CREAM TOPICAL
COMMUNITY

## 2023-10-12 RX ORDER — ESTRADIOL 0.1 MG/G
CREAM VAGINAL
COMMUNITY
Start: 2015-11-17

## 2023-10-12 RX ORDER — CHLORHEXIDINE GLUCONATE ORAL RINSE 1.2 MG/ML
SOLUTION DENTAL
COMMUNITY

## 2023-10-12 RX ORDER — HYDROCODONE BITARTRATE AND ACETAMINOPHEN 5; 325 MG/1; MG/1
TABLET ORAL
COMMUNITY

## 2023-10-12 RX ORDER — PIMECROLIMUS 10 MG/G
CREAM TOPICAL 2 TIMES DAILY
COMMUNITY

## 2023-10-12 RX ORDER — DEXTROAMPHETAMINE SACCHARATE, AMPHETAMINE ASPARTATE, DEXTROAMPHETAMINE SULFATE AND AMPHETAMINE SULFATE 5; 5; 5; 5 MG/1; MG/1; MG/1; MG/1
TABLET ORAL
COMMUNITY
Start: 2022-06-14

## 2023-10-12 RX ORDER — OXYCODONE HYDROCHLORIDE AND ACETAMINOPHEN 5; 325 MG/1; MG/1
TABLET ORAL
COMMUNITY

## 2023-10-12 RX ORDER — KETOCONAZOLE 20 MG/ML
SHAMPOO TOPICAL
COMMUNITY

## 2023-10-12 RX ORDER — LEVONORGESTREL 52 MG/1
INTRAUTERINE DEVICE INTRAUTERINE
COMMUNITY

## 2023-10-12 RX ORDER — MELOXICAM 15 MG/1
1 TABLET ORAL DAILY
COMMUNITY

## 2023-10-12 RX ORDER — DICLOFENAC SODIUM 75 MG/1
1 TABLET, DELAYED RELEASE ORAL 2 TIMES DAILY PRN
COMMUNITY

## 2023-10-20 ENCOUNTER — HOSPITAL ENCOUNTER (OUTPATIENT)
Facility: HOSPITAL | Age: 33
Discharge: HOME | End: 2023-10-20
Attending: SPECIALIST
Payer: OTHER GOVERNMENT

## 2023-10-20 DIAGNOSIS — M22.42 CHONDROMALACIA, PATELLA, LEFT: ICD-10-CM

## 2023-10-20 PROCEDURE — 73721 MRI JNT OF LWR EXTRE W/O DYE: CPT

## 2023-10-27 ENCOUNTER — OFFICE VISIT (OUTPATIENT)
Age: 33
End: 2023-10-27

## 2023-10-27 VITALS — TEMPERATURE: 97.5 F | WEIGHT: 250 LBS | HEIGHT: 65 IN | BODY MASS INDEX: 41.65 KG/M2

## 2023-10-27 DIAGNOSIS — M17.12 UNILATERAL PRIMARY OSTEOARTHRITIS, LEFT KNEE: Primary | ICD-10-CM

## 2023-10-27 RX ORDER — BETAMETHASONE SODIUM PHOSPHATE AND BETAMETHASONE ACETATE 3; 3 MG/ML; MG/ML
3 INJECTION, SUSPENSION INTRA-ARTICULAR; INTRALESIONAL; INTRAMUSCULAR; SOFT TISSUE ONCE
Status: COMPLETED | OUTPATIENT
Start: 2023-10-27 | End: 2023-10-27

## 2023-10-27 RX ORDER — BETAMETHASONE DIPROPIONATE 0.5 MG/G
LOTION TOPICAL
COMMUNITY
Start: 2023-10-21

## 2023-10-27 RX ADMIN — BETAMETHASONE SODIUM PHOSPHATE AND BETAMETHASONE ACETATE 3 MG: 3; 3 INJECTION, SUSPENSION INTRA-ARTICULAR; INTRALESIONAL; INTRAMUSCULAR; SOFT TISSUE at 09:37
